# Patient Record
Sex: FEMALE | Race: WHITE | NOT HISPANIC OR LATINO | Employment: FULL TIME | URBAN - METROPOLITAN AREA
[De-identification: names, ages, dates, MRNs, and addresses within clinical notes are randomized per-mention and may not be internally consistent; named-entity substitution may affect disease eponyms.]

---

## 2017-07-11 ENCOUNTER — HISTORICAL (OUTPATIENT)
Dept: LAB | Facility: HOSPITAL | Age: 32
End: 2017-07-11

## 2017-07-11 LAB
ABS NEUT (OLG): 3.04 X10(3)/MCL (ref 2.1–9.2)
ALBUMIN SERPL-MCNC: 3.6 GM/DL (ref 3.4–5)
ALBUMIN/GLOB SERPL: 1.2 RATIO (ref 1.1–2)
ALP SERPL-CCNC: 44 UNIT/L (ref 38–126)
ALT SERPL-CCNC: 19 UNIT/L (ref 12–78)
AST SERPL-CCNC: 13 UNIT/L (ref 15–37)
BASOPHILS # BLD AUTO: 0 X10(3)/MCL (ref 0–0.2)
BASOPHILS NFR BLD AUTO: 0 %
BILIRUB SERPL-MCNC: 0.3 MG/DL (ref 0.2–1)
BILIRUBIN DIRECT+TOT PNL SERPL-MCNC: 0.1 MG/DL (ref 0–0.5)
BILIRUBIN DIRECT+TOT PNL SERPL-MCNC: 0.2 MG/DL (ref 0–0.8)
BUN SERPL-MCNC: 9 MG/DL (ref 7–18)
CALCIUM SERPL-MCNC: 8.5 MG/DL (ref 8.5–10.1)
CHLORIDE SERPL-SCNC: 109 MMOL/L (ref 98–107)
CHOLEST SERPL-MCNC: 126 MG/DL (ref 0–200)
CHOLEST/HDLC SERPL: 1.9 {RATIO} (ref 0–4)
CO2 SERPL-SCNC: 25 MMOL/L (ref 21–32)
CREAT SERPL-MCNC: 0.76 MG/DL (ref 0.55–1.02)
EOSINOPHIL # BLD AUTO: 0.1 X10(3)/MCL (ref 0–0.9)
EOSINOPHIL NFR BLD AUTO: 1 %
ERYTHROCYTE [DISTWIDTH] IN BLOOD BY AUTOMATED COUNT: 12.8 % (ref 11.5–17)
GLOBULIN SER-MCNC: 2.9 GM/DL (ref 2.4–3.5)
GLUCOSE SERPL-MCNC: 94 MG/DL (ref 74–106)
HCT VFR BLD AUTO: 36 % (ref 37–47)
HDLC SERPL-MCNC: 67 MG/DL (ref 35–60)
HGB BLD-MCNC: 12 GM/DL (ref 12–16)
LDLC SERPL CALC-MCNC: 43 MG/DL (ref 0–129)
LYMPHOCYTES # BLD AUTO: 2.1 X10(3)/MCL (ref 0.6–4.6)
LYMPHOCYTES NFR BLD AUTO: 38 %
MCH RBC QN AUTO: 33 PG (ref 27–31)
MCHC RBC AUTO-ENTMCNC: 33.3 GM/DL (ref 33–36)
MCV RBC AUTO: 98.9 FL (ref 80–94)
MONOCYTES # BLD AUTO: 0.3 X10(3)/MCL (ref 0.1–1.3)
MONOCYTES NFR BLD AUTO: 6 %
NEUTROPHILS # BLD AUTO: 3.04 X10(3)/MCL (ref 2.1–9.2)
NEUTROPHILS NFR BLD AUTO: 54 %
PLATELET # BLD AUTO: 248 X10(3)/MCL (ref 130–400)
PMV BLD AUTO: 10.9 FL (ref 9.4–12.4)
POTASSIUM SERPL-SCNC: 4 MMOL/L (ref 3.5–5.1)
PROT SERPL-MCNC: 6.5 GM/DL (ref 6.4–8.2)
RBC # BLD AUTO: 3.64 X10(6)/MCL (ref 4.2–5.4)
SODIUM SERPL-SCNC: 142 MMOL/L (ref 136–145)
TRIGL SERPL-MCNC: 81 MG/DL (ref 30–150)
VLDLC SERPL CALC-MCNC: 16 MG/DL
WBC # SPEC AUTO: 5.6 X10(3)/MCL (ref 4.5–11.5)

## 2017-09-18 LAB — RAPID GROUP A STREP (OHS): NEGATIVE

## 2018-01-11 ENCOUNTER — HISTORICAL (OUTPATIENT)
Dept: LAB | Facility: HOSPITAL | Age: 33
End: 2018-01-11

## 2018-01-11 LAB
ABS NEUT (OLG): 3.46 X10(3)/MCL (ref 2.1–9.2)
ALBUMIN SERPL-MCNC: 4 GM/DL (ref 3.4–5)
ALBUMIN/GLOB SERPL: 1 {RATIO}
ALP SERPL-CCNC: 47 UNIT/L (ref 45–117)
ALT SERPL-CCNC: 22 UNIT/L (ref 13–56)
AST SERPL-CCNC: 16 UNIT/L (ref 15–37)
BASOPHILS # BLD AUTO: 0.03 X10(3)/MCL (ref 0–0.2)
BASOPHILS NFR BLD AUTO: 0.5 % (ref 0–1)
BILIRUB SERPL-MCNC: 0.6 MG/DL (ref 0.2–1)
BILIRUBIN DIRECT+TOT PNL SERPL-MCNC: 0.2 MG/DL (ref 0–0.2)
BILIRUBIN DIRECT+TOT PNL SERPL-MCNC: 0.4 MG/DL (ref 0–1)
BUN SERPL-MCNC: 11 MG/DL (ref 7–18)
CALCIUM SERPL-MCNC: 8.6 MG/DL (ref 8.5–10.1)
CHLORIDE SERPL-SCNC: 105 MMOL/L (ref 98–107)
CO2 SERPL-SCNC: 24 MMOL/L (ref 21–32)
CREAT SERPL-MCNC: 0.74 MG/DL (ref 0.55–1.02)
EOSINOPHIL # BLD AUTO: 0.03 X10(3)/MCL (ref 0–0.9)
EOSINOPHIL NFR BLD AUTO: 0.5 % (ref 0–6.4)
ERYTHROCYTE [DISTWIDTH] IN BLOOD BY AUTOMATED COUNT: 12.1 % (ref 11.5–17)
FERRITIN SERPL-MCNC: 122.6 NG/ML (ref 8–388)
GLOBULIN SER-MCNC: 3 GM/DL (ref 2–4)
GLUCOSE SERPL-MCNC: 105 MG/DL (ref 74–106)
HCT VFR BLD AUTO: 37.3 % (ref 37–47)
HGB BLD-MCNC: 12.9 GM/DL (ref 12–16)
IMM GRANULOCYTES # BLD AUTO: 0.02 10*3/UL (ref 0–0.02)
IMM GRANULOCYTES NFR BLD AUTO: 0.3 % (ref 0–0.43)
LYMPHOCYTES # BLD AUTO: 2.29 X10(3)/MCL (ref 0.6–4.6)
LYMPHOCYTES NFR BLD AUTO: 37.2 % (ref 16–44)
MAGNESIUM SERPL-MCNC: 2.2 MG/DL (ref 1.8–2.4)
MCH RBC QN AUTO: 32.7 PG (ref 27–31)
MCHC RBC AUTO-ENTMCNC: 34.6 GM/DL (ref 33–36)
MCV RBC AUTO: 94.4 FL (ref 80–94)
MONOCYTES # BLD AUTO: 0.33 X10(3)/MCL (ref 0.1–1.3)
MONOCYTES NFR BLD AUTO: 5.4 % (ref 4–12.1)
NEUTROPHILS # BLD AUTO: 3.46 X10(3)/MCL (ref 2.1–9.2)
NEUTROPHILS NFR BLD AUTO: 56.1 % (ref 43–73)
NRBC BLD AUTO-RTO: 0 % (ref 0–0.2)
PLATELET # BLD AUTO: 252 X10(3)/MCL (ref 130–400)
PMV BLD AUTO: 9.8 FL (ref 7.4–10.4)
POTASSIUM SERPL-SCNC: 3.9 MMOL/L (ref 3.5–5.1)
PROT SERPL-MCNC: 7.4 GM/DL (ref 6.4–8.2)
RBC # BLD AUTO: 3.95 X10(6)/MCL (ref 4.2–5.4)
SODIUM SERPL-SCNC: 137 MMOL/L (ref 136–145)
TSH SERPL-ACNC: 0.87 MIU/ML (ref 0.36–3.74)
WBC # SPEC AUTO: 6.2 X10(3)/MCL (ref 4.5–11.5)

## 2018-07-17 ENCOUNTER — HISTORICAL (OUTPATIENT)
Dept: ADMINISTRATIVE | Facility: HOSPITAL | Age: 33
End: 2018-07-17

## 2018-07-17 LAB
BILIRUB SERPL-MCNC: NEGATIVE MG/DL
BLOOD URINE, POC: NEGATIVE
CLARITY, POC UA: NORMAL
COLOR, POC UA: YELLOW
GLUCOSE UR QL STRIP: NEGATIVE
KETONES UR QL STRIP: NEGATIVE
LEUKOCYTE EST, POC UA: NEGATIVE
NITRITE, POC UA: NEGATIVE
PH, POC UA: 7
POC BETA-HCG (QUAL): NEGATIVE
PROTEIN, POC: NEGATIVE
SPECIFIC GRAVITY, POC UA: 1.01
UROBILINOGEN, POC UA: NORMAL

## 2018-07-18 ENCOUNTER — HISTORICAL (OUTPATIENT)
Dept: URGENT CARE | Facility: CLINIC | Age: 33
End: 2018-07-18

## 2018-07-20 LAB — FINAL CULTURE: NO GROWTH

## 2018-08-27 LAB — RAPID GROUP A STREP (OHS): NEGATIVE

## 2018-11-28 ENCOUNTER — HISTORICAL (OUTPATIENT)
Dept: LAB | Facility: HOSPITAL | Age: 33
End: 2018-11-28

## 2018-11-28 LAB
T3FREE SERPL-MCNC: 3.14 PG/ML (ref 2.18–3.98)
T4 FREE SERPL-MCNC: 1.08 NG/DL (ref 0.76–1.46)
TSH SERPL-ACNC: 1.43 MIU/L (ref 0.36–3.74)

## 2019-01-11 ENCOUNTER — HISTORICAL (OUTPATIENT)
Dept: URGENT CARE | Facility: CLINIC | Age: 34
End: 2019-01-11

## 2019-01-11 LAB
INFLUENZA A ANTIGEN, POC: POSITIVE
INFLUENZA B ANTIGEN, POC: NEGATIVE
RAPID GROUP A STREP (OHS): POSITIVE

## 2019-01-13 LAB — FINAL CULTURE: NORMAL

## 2019-02-21 ENCOUNTER — HISTORICAL (OUTPATIENT)
Dept: ADMINISTRATIVE | Facility: HOSPITAL | Age: 34
End: 2019-02-21

## 2019-02-21 LAB
BILIRUB SERPL-MCNC: NEGATIVE MG/DL
BLOOD URINE, POC: NORMAL
CLARITY, POC UA: CLEAR
COLOR, POC UA: YELLOW
GLUCOSE UR QL STRIP: NEGATIVE
KETONES UR QL STRIP: NORMAL
LEUKOCYTE EST, POC UA: NEGATIVE
NITRITE, POC UA: NEGATIVE
PH, POC UA: 6.5
PROTEIN, POC: NEGATIVE
SPECIFIC GRAVITY, POC UA: 1.01
UROBILINOGEN, POC UA: NORMAL

## 2019-07-01 LAB — RAPID GROUP A STREP (OHS): NEGATIVE

## 2019-09-10 ENCOUNTER — HISTORICAL (OUTPATIENT)
Dept: ADMINISTRATIVE | Facility: HOSPITAL | Age: 34
End: 2019-09-10

## 2019-09-24 ENCOUNTER — HISTORICAL (OUTPATIENT)
Dept: RADIOLOGY | Facility: HOSPITAL | Age: 34
End: 2019-09-24

## 2019-11-09 ENCOUNTER — HISTORICAL (OUTPATIENT)
Dept: ADMINISTRATIVE | Facility: HOSPITAL | Age: 34
End: 2019-11-09

## 2019-11-19 ENCOUNTER — HISTORICAL (OUTPATIENT)
Dept: ADMINISTRATIVE | Facility: HOSPITAL | Age: 34
End: 2019-11-19

## 2019-11-20 ENCOUNTER — HISTORICAL (OUTPATIENT)
Dept: ADMINISTRATIVE | Facility: HOSPITAL | Age: 34
End: 2019-11-20

## 2019-11-22 ENCOUNTER — HISTORICAL (OUTPATIENT)
Dept: SURGERY | Facility: HOSPITAL | Age: 34
End: 2019-11-22

## 2020-11-10 LAB
INFLUENZA A ANTIGEN, POC: NEGATIVE
INFLUENZA B ANTIGEN, POC: NEGATIVE
RAPID GROUP A STREP (OHS): NEGATIVE

## 2020-12-30 ENCOUNTER — HISTORICAL (OUTPATIENT)
Dept: LAB | Facility: HOSPITAL | Age: 35
End: 2020-12-30

## 2020-12-30 LAB
ABS NEUT (OLG): 3.71 X10(3)/MCL (ref 2.1–9.2)
ALBUMIN SERPL-MCNC: 3.9 GM/DL (ref 3.5–5)
ALBUMIN/GLOB SERPL: 1.6 RATIO (ref 1.1–2)
ALP SERPL-CCNC: 73 UNIT/L (ref 40–150)
ALT SERPL-CCNC: 47 UNIT/L (ref 0–55)
AST SERPL-CCNC: 24 UNIT/L (ref 5–34)
BASOPHILS # BLD AUTO: 0.04 X10(3)/MCL (ref 0–0.2)
BASOPHILS NFR BLD AUTO: 0.6 % (ref 0–1)
BILIRUB SERPL-MCNC: 0.5 MG/DL (ref 0.2–1.2)
BILIRUBIN DIRECT+TOT PNL SERPL-MCNC: 0.2 MG/DL (ref 0–0.5)
BILIRUBIN DIRECT+TOT PNL SERPL-MCNC: 0.3 MG/DL (ref 0–0.8)
BUN SERPL-MCNC: 8.5 MG/DL (ref 7–18.7)
CALCIUM SERPL-MCNC: 8.8 MG/DL (ref 8.4–10.2)
CHLORIDE SERPL-SCNC: 107 MMOL/L (ref 98–107)
CHOLEST SERPL-MCNC: 140 MG/DL
CHOLEST/HDLC SERPL: 2 {RATIO} (ref 0–5)
CO2 SERPL-SCNC: 25 MMOL/L (ref 22–29)
CREAT SERPL-MCNC: 0.79 MG/DL (ref 0.57–1.11)
EOSINOPHIL # BLD AUTO: 0.07 X10(3)/MCL (ref 0–0.9)
EOSINOPHIL NFR BLD AUTO: 1.1 % (ref 0–6.4)
ERYTHROCYTE [DISTWIDTH] IN BLOOD BY AUTOMATED COUNT: 12.3 % (ref 11.5–17)
GLOBULIN SER-MCNC: 2.5 GM/DL (ref 2.4–3.5)
GLUCOSE SERPL-MCNC: 98 MG/DL (ref 74–100)
HCT VFR BLD AUTO: 36.5 % (ref 37–47)
HDLC SERPL-MCNC: 57 MG/DL (ref 40–60)
HGB BLD-MCNC: 12.3 GM/DL (ref 12–16)
IMM GRANULOCYTES # BLD AUTO: 0.01 10*3/UL (ref 0–0.02)
IMM GRANULOCYTES NFR BLD AUTO: 0.2 % (ref 0–0.43)
LDLC SERPL CALC-MCNC: 67 MG/DL (ref 50–140)
LYMPHOCYTES # BLD AUTO: 2.24 X10(3)/MCL (ref 0.6–4.6)
LYMPHOCYTES NFR BLD AUTO: 34.9 % (ref 16–44)
MCH RBC QN AUTO: 32.5 PG (ref 27–31)
MCHC RBC AUTO-ENTMCNC: 33.7 GM/DL (ref 33–36)
MCV RBC AUTO: 96.3 FL (ref 80–94)
MONOCYTES # BLD AUTO: 0.34 X10(3)/MCL (ref 0.1–1.3)
MONOCYTES NFR BLD AUTO: 5.3 % (ref 4–12.1)
NEUTROPHILS # BLD AUTO: 3.71 X10(3)/MCL (ref 2.1–9.2)
NEUTROPHILS NFR BLD AUTO: 57.9 % (ref 43–73)
NRBC BLD AUTO-RTO: 0 % (ref 0–0.2)
PLATELET # BLD AUTO: 286 X10(3)/MCL (ref 130–400)
PMV BLD AUTO: 9.4 FL (ref 7.4–10.4)
POTASSIUM SERPL-SCNC: 3.7 MMOL/L (ref 3.5–5.1)
PROT SERPL-MCNC: 6.4 GM/DL (ref 6.4–8.3)
RBC # BLD AUTO: 3.79 X10(6)/MCL (ref 4.2–5.4)
SODIUM SERPL-SCNC: 140 MMOL/L (ref 136–145)
TRIGL SERPL-MCNC: 82 MG/DL (ref 0–150)
TSH SERPL-ACNC: 1.66 UIU/ML (ref 0.35–4.94)
VLDLC SERPL CALC-MCNC: 16 MG/DL
WBC # SPEC AUTO: 6.4 X10(3)/MCL (ref 4.5–11.5)

## 2021-01-04 LAB — PAP RECOMMENDATION EXT: NORMAL

## 2021-04-15 LAB
RAPID GROUP A STREP (OHS): NEGATIVE
SARS-COV-2 RNA RESP QL NAA+PROBE: POSITIVE

## 2021-11-17 LAB
RAPID GROUP A STREP (OHS): NEGATIVE
SARS-COV-2 RNA RESP QL NAA+PROBE: NEGATIVE

## 2021-12-30 ENCOUNTER — HISTORICAL (OUTPATIENT)
Dept: LAB | Facility: HOSPITAL | Age: 36
End: 2021-12-30

## 2021-12-30 LAB
ABS NEUT (OLG): 3.2 X10(3)/MCL (ref 2.1–9.2)
ALBUMIN SERPL-MCNC: 4.1 GM/DL (ref 3.5–5)
ALBUMIN/GLOB SERPL: 1.6 RATIO (ref 1.1–2)
ALP SERPL-CCNC: 64 UNIT/L (ref 40–150)
ALT SERPL-CCNC: 14 UNIT/L (ref 0–55)
AST SERPL-CCNC: 18 UNIT/L (ref 5–34)
BASOPHILS # BLD AUTO: 0.05 X10(3)/MCL (ref 0–0.2)
BASOPHILS NFR BLD AUTO: 0.9 % (ref 0–1)
BILIRUB SERPL-MCNC: 0.6 MG/DL (ref 0.2–1.2)
BILIRUBIN DIRECT+TOT PNL SERPL-MCNC: 0.2 MG/DL (ref 0–0.5)
BILIRUBIN DIRECT+TOT PNL SERPL-MCNC: 0.4 MG/DL (ref 0–0.8)
BUN SERPL-MCNC: 9.3 MG/DL (ref 7–18.7)
CALCIUM SERPL-MCNC: 9.4 MG/DL (ref 8.4–10.2)
CHLORIDE SERPL-SCNC: 106 MMOL/L (ref 98–107)
CHOLEST SERPL-MCNC: 138 MG/DL
CHOLEST/HDLC SERPL: 2 {RATIO} (ref 0–5)
CO2 SERPL-SCNC: 27 MMOL/L (ref 22–29)
CREAT SERPL-MCNC: 0.77 MG/DL (ref 0.57–1.11)
EOSINOPHIL # BLD AUTO: 0.03 X10(3)/MCL (ref 0–0.9)
EOSINOPHIL NFR BLD AUTO: 0.5 % (ref 0–6.4)
ERYTHROCYTE [DISTWIDTH] IN BLOOD BY AUTOMATED COUNT: 13 % (ref 11.5–17)
GLOBULIN SER-MCNC: 2.6 GM/DL (ref 2.4–3.5)
GLUCOSE SERPL-MCNC: 101 MG/DL (ref 74–100)
HCT VFR BLD AUTO: 39.6 % (ref 37–47)
HDLC SERPL-MCNC: 61 MG/DL (ref 40–60)
HGB BLD-MCNC: 13.4 GM/DL (ref 12–16)
IMM GRANULOCYTES # BLD AUTO: 0.01 10*3/UL (ref 0–0.02)
IMM GRANULOCYTES NFR BLD AUTO: 0.2 % (ref 0–0.43)
LDLC SERPL CALC-MCNC: 63 MG/DL (ref 50–140)
LYMPHOCYTES # BLD AUTO: 2.01 X10(3)/MCL (ref 0.6–4.6)
LYMPHOCYTES NFR BLD AUTO: 35.8 % (ref 16–44)
MCH RBC QN AUTO: 33.8 PG (ref 27–31)
MCHC RBC AUTO-ENTMCNC: 33.8 GM/DL (ref 33–36)
MCV RBC AUTO: 99.7 FL (ref 80–94)
MONOCYTES # BLD AUTO: 0.32 X10(3)/MCL (ref 0.1–1.3)
MONOCYTES NFR BLD AUTO: 5.7 % (ref 4–12.1)
NEUTROPHILS # BLD AUTO: 3.2 X10(3)/MCL (ref 2.1–9.2)
NEUTROPHILS NFR BLD AUTO: 56.9 % (ref 43–73)
NRBC BLD AUTO-RTO: 0 % (ref 0–0.2)
PLATELET # BLD AUTO: 278 X10(3)/MCL (ref 130–400)
PMV BLD AUTO: 10 FL (ref 7.4–10.4)
POTASSIUM SERPL-SCNC: 4.3 MMOL/L (ref 3.5–5.1)
PROT SERPL-MCNC: 6.7 GM/DL (ref 6.4–8.3)
RBC # BLD AUTO: 3.97 X10(6)/MCL (ref 4.2–5.4)
SODIUM SERPL-SCNC: 140 MMOL/L (ref 136–145)
TRIGL SERPL-MCNC: 71 MG/DL (ref 0–150)
TSH SERPL-ACNC: 1.03 UIU/ML (ref 0.35–4.94)
VLDLC SERPL CALC-MCNC: 14 MG/DL
WBC # SPEC AUTO: 5.6 X10(3)/MCL (ref 4.5–11.5)

## 2022-04-11 ENCOUNTER — HISTORICAL (OUTPATIENT)
Dept: ADMINISTRATIVE | Facility: HOSPITAL | Age: 37
End: 2022-04-11
Payer: COMMERCIAL

## 2022-04-25 VITALS
OXYGEN SATURATION: 98 % | BODY MASS INDEX: 24.77 KG/M2 | HEIGHT: 60 IN | WEIGHT: 126.19 LBS | SYSTOLIC BLOOD PRESSURE: 108 MMHG | DIASTOLIC BLOOD PRESSURE: 77 MMHG

## 2022-05-05 NOTE — HISTORICAL [facility] CERNER
This is a historical note converted from Chang. Formatting and pictures may have been removed.  Please reference Chang for original formatting and attached multimedia. OPERATIVE REPORT  ?  DATE: 11/22/2019  ?  ASSISTANT: Jennifer Burnette  ?  PREOPERATIVE DIAGNOSIS:  1.? Right chronic ankle instability  ?  POSTOPERATIVE DIAGNOSIS:  Same  ?  PROCEDURES:  1. ?Right?ankle Broström procedure  ?  ANESTHESIA:  General anesthesia  ?  BLOOD LOSS:  None  ?  DVT PROPHYLAXIS:  This patient placed on aspirin for 2 weeks  ?  INSTRUMENTATION:  Arthrex?internal brace with?fiber tape,?Arthrex?3.5 swivel lock anchor, Arthrex?4.75 swivel lock anchor  ?  PROCEDURE IN DETAIL:  ?  Brostrum Procedure with Internal Brace augmentation  ?  This patient was brought to the room placed on table in a supine position. ?The operative extremity was draped and prepped in the normal sterile fashion. ?We performed a timeout to confirm the patient, the operative extremity, the procedure, if antibiotics were given, and allergies. ?  ?  A longitudinal incision was made over distal fibula. Careful dissection was carried deep until the extensor retinaculum and the peroneal tendons were seen. The peroneal tendons were then protected with a retractor. A hemostat was then passed across the redundant ATFL. The ATFL was then transected. I then repaired the ATFL with a 0 non-absorbable suture in pants over vest fashion. I did this with 3 sutures. I then reinforced the Brostrum repair with an Internal Brace. A suture anchor was placed into the talus and into the distal fibula. A suture tape was used between the suture anchors for reinforcement.  ?  The patients incision and wound was thoroughly irrigated. ?I then closed the subcutaneous tissues with a 2-0 Vicryl and then the skin with a 3-0 nylon. ?A sterile dressing was applied and patient was placed in a short leg posterior splint. ?Patient tolerated the procedure well without any intraoperative  complications.

## 2022-05-05 NOTE — HISTORICAL [facility] CERNER
This is a historical note converted from Chang. Formatting and pictures may have been removed.  Please reference Chang for original formatting and attached multimedia. Chief Complaint  Numbness, swelling ?to right wrist with pain with tingling post fall on 11/9. Seen by Dr. Mireles, elbow Xray done, casted with removal of splint on yesterday then place in mobile spint. Since then doran has increase to wrist. Advised to have wrist Xray done  History of Present Illness  34-year-old present to clinic with a history of right wrist pain on and off for 2 weeks.? States post fall?elbow fracture?for which patient was in heart brace for 2 weeks.? Brace was removed yesterday.? States pain more focused?to right wrist now.? Bruising?palmar aspect.? Right fingers range of motion present.? Currently using elbow brace to keep the joint in neutral position. ?Follows up with?primary orthopedics Dr. Messina  Review of Systems  Constitutional : No fever, no fatigue  Neck : Negative except HPI  Respiratory : No shortness of breath, no wheezing  Cardiovascular : No chest pain  Musculoskeletal : Negative except HPI  Integumentary : No rash, no abnormal lesion  Neurological : Negative for tingling numbness and weakness  Physical Exam  Vitals & Measurements  T:?37.3? ?C (Tympanic)? HR:?104(Peripheral)? RR:?16? BP:?131/93? SpO2:?100%?  HT:?153?cm? WT:?55?kg? BMI:?23.5?  General : Alert and oriented, No apparent distress, Afebrile  Neck -: supple, Non Tender  Respiratory :Lungs are clear to auscultate, Breath sounds are equal  Cardiovascular : Normal rate, normal volume pulse bilateral  Muskeloskeletal :?Right elbow?brace present.? Right wrist?mild bruising?palmar aspect, tender to palpate.? Right fingers range of motion present.? Radial pulse 2+ bilateral  Integumentary :?Warm, Dry?  Neurologic : Alert and Oriented X 4, sensations intact, motor intact  Assessment/Plan  1.?Right wrist pain?M25.531  ?Reviewed the x-rays, no concerns of acute  finding.? Radiology final results will be monitored and reported.? With persistent symptoms will follow-up with primary orthopedics  Rest, ice,?Tylenol or ibuprofen for pain.? Call or return to clinic for any questions  Ordered:  Office/Outpatient Visit Level 3 Established [id number] PC, Right wrist pain, [facility]-RR, 11/20/19 18:07:00 CST  XR Wrist Right Minimum 3 Views, Routine, 11/20/19 17:51:00 CST, Fall, Fall 2 weeks surgery, None, Ambulatory, Rad Type, Right wrist pain, Not Scheduled, 11/20/19 17:51:00 CST  ?   Problem List/Past Medical History  Ongoing  Anxiety  Bilateral hand numbness  Bulging of cervical intervertebral disc  Cervicalgia  Dislocation of right elbow  Fatigue  Fibromyalgia  Hydromyelia  Insomnia  Migraine headache  Mitral valve prolapse  Nausea  OCD - Obsessive-compulsive disorder  Otitis media  Palpitations  Restless leg syndrome  Right ankle instability  Historical  Contraceptive surveillance  Sinusitis  Procedure/Surgical History  Surgical removal of wisdom tooth (08.2018)  Carpal tunnel release (12.2011)  Carpal tunnel release (11.2011)   Medications  Acidophilus Probiotic Blend oral capsule, 1 cap(s), Oral, Daily  busPIRone 15 mg oral tablet, 15 mg= 1 tab(s), Oral, BID, 3 refills,? ?Still taking, not as prescribed: once daily  cyclobenzaprine 10 mg oral tablet, See Instructions, 1 refills,? ?Still taking, not as prescribed: half tablet nightly per pt  Flonase 50 mcg/inh nasal spray, 1 spray(s), Nasal, Daily, 5 refills,? ?Still taking, not as prescribed: prn  Maxalt-MLT 5 mg oral tablet, disintegrating, 5 mg= 1 tab(s), Oral, Daily, PRN, 1 refills,? ?Not taking  potassium chloride 99 mg oral tablet, 99 mg= 1 tab(s), Oral, Daily  ropinirole 0.25 mg oral tablet, See Instructions, 2 refills  Vitamin B12 500 mcg oral tablet, 500 mcg= 1 tab(s), Oral, Daily  Vitamin D3 1000 intl units oral tablet, 1000 IntUnit= 1 tab(s), Oral, Daily  Zofran 4 mg oral tablet, 4 mg= 1 tab(s), Oral, q8hr, 2 refills,? ?Still  taking, not as prescribed: prn  zolpidem 10 mg oral tablet, 10 mg= 1 tab(s), Oral, Once a day (at bedtime), 2 refills  Allergies  penicillins?(Rash)  Social History  Abuse/Neglect  No, 11/20/2019  Alcohol  Current, 1-2 times per month, 11/19/2019  Employment/School  Employed, Work/School description: ., 01/11/2016  Exercise  Exercise duration: 30. Exercise frequency: 3-4 times/week. Self assessment: Good condition. Exercise type: Walking., 01/13/2016  Home/Environment  Lives with Spouse., 01/11/2016  Nutrition/Health  Regular, 01/13/2016  Sexual  Sexually active: Yes., 01/13/2016  Substance Use  Never, 11/19/2019  Tobacco  Never (less than 100 in lifetime), No, 11/20/2019  Family History  Asthma.: Mother.  Depression 27-APR-2016 23:33:46<$>: Mother.  Fibromyalgia: Mother.  Heart disease: Father.  Health Maintenance  Health Maintenance  ???Pending?(in the next year)  ??? ??Due?  ??? ? ? ?ADL Screening due??11/20/19??and every 1??year(s)  ??? ? ? ?Influenza Vaccine due??11/20/19??and every?  ??? ? ? ?Tetanus Vaccine due??11/20/19??and every 10??year(s)  ??? ??Due In Future?  ??? ? ? ?Alcohol Misuse Screening not due until??01/01/20??and every 1??year(s)  ??? ? ? ?Obesity Screening not due until??01/01/20??and every 1??year(s)  ??? ? ? ?Depression Screening not due until??04/07/20??and every 1??year(s)  ??? ? ? ?Cervical Cancer Screening not due until??10/10/20??and every 3??year(s)  ??? ? ? ?Blood Pressure Screening not due until??11/19/20??and every 1??year(s)  ??? ? ? ?Body Mass Index Check not due until??11/19/20??and every 1??year(s)  ???Satisfied?(in the past 1 year)  ??? ??Satisfied?  ??? ? ? ?Alcohol Misuse Screening on??10/01/19.??Satisfied by June Medina  ??? ? ? ?Blood Pressure Screening on??11/20/19.??Satisfied by Alvarado Carreno LPN  ??? ? ? ?Body Mass Index Check on??11/20/19.??Satisfied by Alvarado Carreno LPN  ??? ? ? ?Depression Screening on??04/08/19.??Satisfied by David  Pao BEST.  ??? ? ? ?Influenza Vaccine on??02/21/19.??Satisfied by Vince Mcelroy LPN  ??? ? ? ?Obesity Screening on??11/20/19.??Satisfied by Alvarado Carreno LPN  ?

## 2022-05-05 NOTE — HISTORICAL [facility]
This is a historical note converted from Chang. Formatting and pictures may have been removed.  Please reference Chang for original formatting and attached multimedia. Chief Complaint  Right foot pain and swelling for 2 months, states the pain radiate up to her Rt Ankle. ?Pt states she tripped over some Trolley tracks in [place]. ?Xrays done ?[facility] on 6/27/19. ?Pt states she tought the pain would go away, but still have pain.  History of Present Illness  This is a 33-year-old female that had a significant?ankle sprain approximately 2 to 3 months ago.? She states she was in [place] and it was at night and she tripped over a trolley track?and significantly sprained her ankle.? She went to the emergency room when she returned back to left yet and they referred her to see me.? She does not have any more bruising or ecchymosis but she continues to complain of weakness and?a weird type of nerve pain?throughout the ankle and foot.  Review of Systems  GENERAL: No fevers, chills, unexpected weight loss or gain  MUSCULOSKELETAL: Joint pain, extremity pain  Physical Exam  Vitals & Measurements  BP:?128/94?  HT:?153?cm? WT:?56?kg? BMI:?23.92?  General: Well-developed, well-nourished.  Neuro: Alert and oriented x 3.  Psych: Normal mood and affect.  CV: Palpable radial pulses.  Resp: Smooth and unlabored.  Skin: No evidence of focal lesions or trauma.  Hem/Imm/Lymph: No evidence of lymphangitis or adenopathy.  Gait: No trendelenburg gait.  DTR: 2+, no hypo or hyperreflexia.  Coordination and Balance: No tremors or abnormal station.  Ankle Exam:  No obvious deformity. Plantar flexion and dorsiflexion is 60 degrees and 30 degrees, respectively. Negative squeeze test. Negative lateral malleolus tenderness. Negative medial malleolus tenderness.? Positive talofibular ligament tenderness.? Positive anterior draw and lateral tilt. 4/5 strength, normal skin appearance and palpable pulses distally. Sensibility  normal.  Assessment/Plan  1.?Right ankle sprain?S93.401A  ?Plan order MRI of this patients ankle to rule out a chronic?ankle?ligament?rupture.? I think that her lateral?ankle ligaments did not heal. ?I think she also has a?intrasubstance tear of the peroneal tendons and also a superficial peroneal nerve?injury.? I told her that there is not much we can do about the nerve.? However if the MRI shows?chronic ligament ruptures and peroneal tendon tears that we could possibly fix this with a surgical procedure.? I gave her some information on all of these?different?injuries.? She will come back to review her MRI.  Ordered:  MRI Ext Lower Joint Right W/O Contrast, Routine, 09/10/19 16:31:00 CDT, Injury, knee & below, Rule out anterior inferior talofibular ligament rupture and peroneal tendon intrasubstance tears, None, Ambulatory, Rad Type, Order for future visit, Right ankle sprain  Right ankle instability ...  Office/Outpatient Visit Level 3 New [id number] PC, Right ankle sprain  Right ankle instability  Peroneal tendon tear  Superficial peroneal nerve neuropathy, [facility], 09/10/19 16:28:00 CDT  ?  2.?Right ankle instability?M25.371  Ordered:  MRI Ext Lower Joint Right W/O Contrast, Routine, 09/10/19 16:31:00 CDT, Injury, knee & below, Rule out anterior inferior talofibular ligament rupture and peroneal tendon intrasubstance tears, None, Ambulatory, Rad Type, Order for future visit, Right ankle sprain  Right ankle instability ...  Office/Outpatient Visit Level 3 New [id number] PC, Right ankle sprain  Right ankle instability  Peroneal tendon tear  Superficial peroneal nerve neuropathy, [facility], 09/10/19 16:28:00 CDT  ?  3.?Peroneal tendon tear?S86.319A  Ordered:  MRI Ext Lower Joint Right W/O Contrast, Routine, 09/10/19 16:31:00 CDT, Injury, knee & below, Rule out anterior inferior talofibular ligament rupture and peroneal tendon intrasubstance tears, None, Ambulatory, Rad Type, Order for  future visit, Right ankle sprain  Right ankle instability ...  Office/Outpatient Visit Level 3 New [id number] PC, Right ankle sprain  Right ankle instability  Peroneal tendon tear  Superficial peroneal nerve neuropathy, [facility], 09/10/19 16:28:00 CDT  ?  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  Office/Outpatient Visit Level 3 New [id number] PC, Right ankle sprain  Right ankle instability  Peroneal tendon tear  [facility]oneal nerve [facility] 09/10[facility] 16:28:00 CDT  ?   Problem List/Past Medical History  Ongoing  Anxiety  Bilateral hand numbness  Bulging of cervical intervertebral disc  Cervicalgia  Fatigue  Fibromyalgia  Hydromyelia  Insomnia  Migraine headache  Mitral valve prolapse  Nausea  OCD - Obsessive-compulsive disorder  Otitis media  Palpitations  Restless leg syndrome  Historical  Contraceptive surveillance  Sinusitis  Procedure/Surgical History  Surgical removal of wisdom tooth (08.2018)  Carpal tunnel release (12.2011)  Carpal tunnel release (11.2011)   Medications  Acidophilus Probiotic Blend oral capsule, 1 cap(s), Oral, Daily  busPIRone 15 mg oral tablet, 15 mg= 1 tab(s), Oral, BID, 3 refills  cyclobenzaprine 10 mg oral tablet, See Instructions, 1 refills  Flonase 50 mcg/inh nasal spray, 1 spray(s), Nasal, Daily, 5 refills  Maxalt-MLT 5 mg oral tablet, disintegrating, 5 mg= 1 tab(s), Oral, Daily, PRN, 1 refills  Medrol Dosepak 4 mg oral tablet, 1 packet(s), Oral, As Directed,? ?Not taking  potassium chloride 99 mg oral tablet, 99 mg= 1 tab(s), Oral, Daily  ropinirole 0.25 mg oral tablet, See Instructions, 2 refills  Vitamin B12 500 mcg oral tablet, 500  mcg= 1 tab(s), Oral, Daily  Vitamin D3 1000 intl units oral tablet, 1000 IntUnit= 1 tab(s), Oral, Daily  Zofran 4 mg oral tablet, 4 mg= 1 tab(s), Oral, q8hr, 1 refills  zolpidem 6.25 mg oral tablet, extended release, 6.25 mg= 1 tab(s), Oral, Once a day (at bedtime), 2 refills  Allergies  penicillins?(Rash)  Social History  Abuse/Neglect  No, 09/10/2019  Alcohol  Current, Beer, 1-2 times per week, 2 drinks/episode average., 04/08/2019  Employment/School  Employed, Work/School description: ., 01/11/2016  Exercise  Exercise duration: 30. Exercise frequency: 3-4 times/week. Self assessment: Good condition. Exercise type: Walking., 01/13/2016  Home/Environment  Lives with Spouse., 01/11/2016  Nutrition/Health  Regular, 01/13/2016  Sexual  Sexually active: Yes., 01/13/2016  Substance Use  Never, 01/11/2016  Tobacco  Never (less than 100 in lifetime), No, 09/10/2019  Family History  Asthma.: Mother.  Depression 27-APR-2016 23:33:46<$>: Mother.  Fibromyalgia: Mother.  Heart disease: Father.  Health Maintenance  Health Maintenance  ???Pending?(in the next year)  ??? ??OverDue  ??? ? ? ?Alcohol Misuse Screening due??01/01/19??and every 1??year(s)  ??? ??Due?  ??? ? ? ?ADL Screening due??09/10/19??and every 1??year(s)  ??? ? ? ?Influenza Vaccine due??09/10/19??and every?  ??? ? ? ?Tetanus Vaccine due??09/10/19??and every 10??year(s)  ??? ??Due In Future?  ??? ? ? ?Obesity Screening not due until??01/01/20??and every 1??year(s)  ??? ? ? ?Depression Screening not due until??04/07/20??and every 1??year(s)  ??? ? ? ?Blood Pressure Screening not due until??07/07/20??and every 1??year(s)  ??? ? ? ?Body Mass Index Check not due until??07/07/20??and every 1??year(s)  ???Satisfied?(in the past 1 year)  ??? ??Satisfied?  ??? ? ? ?Alcohol Misuse Screening on??11/28/18.??Satisfied by Zoraida Tavera LPN  ??? ? ? ?Blood Pressure Screening on??09/10/19.??Satisfied by June Medina  ??? ? ? ?Body Mass Index Check  on??09/10/19.??Satisfied by June Medina  ??? ? ? ?Depression Screening on??04/08/19.??Satisfied by Pao Hernandez  ??? ? ? ?Influenza Vaccine on??02/21/19.??Satisfied by Vince Mcelroy LPN  ??? ? ? ?Obesity Screening on??09/10/19.??Satisfied by June Medina  ?  Diagnostic Results  X-rays show no acute osseous pathology.

## 2022-05-05 NOTE — HISTORICAL [facility]
This is a historical note converted from Chang. Formatting and pictures may have been removed.  Please reference Chang for original formatting and attached multimedia. Chief Complaint  Urinary urgency/frequency onset 07/08 with pain to left lower back area. Also having pressure to bladder  History of Present Illness  32-year-old female presents with concern of about 9 days?of urinary urgency and frequency with associated left lower back pain?and suprapubic pressure?which she thinks is bladder related.?No fever.?Patient thinks she passed the stone?over the last week.?No fevers.?No change in vaginal discharge.? Patient did have issues with constipation and diarrhea over the last week.?Possible fever 1 week prior?but none since then.? Patient also having?sensation of?difficulty emptying her bladder.  Review of Systems  Constitutional_negative for fever  ENMT_negative for sore throat  Respiratory_negative for SOB  Gastrointestinal_negative for nausea or vomiting  Integumentary_negative for rash  All Other ROS_ negative except HPI?  Physical Exam  Vitals & Measurements  T:?37.2? ?C (Oral)? HR:?76(Peripheral)? RR:?16? BP:?131/90? SpO2:?99%?  HT:?153?cm? HT:?153?cm? WT:?53.52?kg? WT:?53.52?kg? BMI:?22.86?  GEN: NAD  CV: S1S2 RRR  RESP: CTA B no RRW  ABD: Mild tenderness throughout,?no focal tenderness,?ND, no CVA tenderness,?minimal suprapubic tenderness  Psych: Approp mood and affect  Assessment/Plan  1.?Acute cystitis  ? Urine pregnancy test negative.  X-ray of the abdomen done today, per my review?no concern for stones, positive constipation noted.?Will call with final report.  Recommend taking Urimar for discomfort,?will send urine for culture, should get urine culture results?in 3 days.? Increase fluid intake.?  Miralax twice a day or Colace twice a day.? Pear juice and lots of water.  ER for?worsening symptoms.  Ordered:  hyoscyamine/methena/mblue/phenylsal/sodbiphos, 1 tab(s), Oral, QID, X 5 day(s), # 20 tab(s), 0  Refill(s), Pharmacy: [id number]  Office/Outpatient Visit Level 4 Established [id number] PC, Acute cystitis, [facility]CC-RR, 07/17/18 17:53:00 CDT  Urine Culture [id number], Routine collect, 07/17/18 17:53:00 CDT, Order for future visit, Urine, Nurse collect, Stop date 07/17/18 17:53:00 CDT, Acute cystitis  Urine Pregnancy POC [id number] PC, 07/17/18 17:53:00 CDT, UCC-RR  XR Abdomen KUB 1 View, Routine, 07/17/18 17:53:00 CDT, Abdominal Pain, suprapubic, L sided, None, Ambulatory, Rad Type, Acute cystitis, Not Scheduled, 07/17/18 17:53:00 CDT  ?   Problem List/Past Medical History  Ongoing  Anxiety  Fatigue  Fibromyalgia  Insomnia  Migraine headache  Mitral valve prolapse  OCD - Obsessive-compulsive disorder  Palpitations  Restless leg syndrome  Historical  Contraceptive surveillance  Sinusitis  Procedure/Surgical History  Carpal tunnel (01/10/2011).  Medications  Acidophilus Probiotic Blend oral capsule, 1 cap(s), Oral, Daily  busPIRone 15 mg oral tablet, 15 mg= 1 tab(s), Oral, BID, 5 refills  cyclobenzaprine 10 mg oral tablet, See Instructions  Microgestin FE 1.5/30 oral tablet, 1 tab(s), Oral, Daily, 11 refills  Multivitamins and Minerals oral tablet  potassium chloride 99 mg oral tablet, 99 mg= 1 tab(s), Oral, Daily  propranolol 20 mg oral tablet, 20 mg= 1 tab(s), Oral, BID, 5 refills  ropinirole 0.25 mg oral tablet, See Instructions, 2 refills  Vitamin B12 500 mcg oral tablet, 500 mcg= 1 tab(s), Oral, Daily  Vitamin D3 1000 intl units oral tablet, 1000 IntUnit= 1 tab(s), Oral, Daily  zolpidem 10 mg oral tablet, 10 mg= 1 tab(s), Oral, Once a day (at bedtime), 2 refills  Allergies  penicillins?(Rash)  Social History  Alcohol  Current, Beer, 1-2 times per month, 01/11/2016  Employment/School  Employed, Work/School description: ., 01/11/2016  Exercise  Exercise duration: 30. Exercise frequency: 3-4 times/week. Self assessment: Good condition. Exercise type: Walking., 01/13/2016  Home/Environment  Lives with Spouse.,  01/11/2016  Nutrition/Health  Regular, 01/13/2016  Sexual  Sexually active: Yes., 01/13/2016  Substance Abuse  Never, 01/11/2016  Tobacco  Former smoker, Cigarettes, 01/11/2016  Family History  Asthma.: Mother.  Depression 27-APR-2016 23:33:46<$>: Mother.  Fibromyalgia: Mother.  Heart disease: Father.  Health Maintenance  Health Maintenance  ???Pending?(in the next year)  ??? ??Due?  ??? ? ? ?Tetanus Vaccine due??07/17/18??and every 10??year(s)  ??? ??Refused?  ??? ? ? ?Influenza Vaccine due??10/02/18??and every 1??year(s)  ??? ??Due In Future?  ??? ? ? ?Alcohol Misuse Screening not due until??10/11/18??and every 1??year(s)  ??? ? ? ?Depression Screening not due until??06/26/19??and every 1??year(s)  ???Satisfied?(in the past 1 year)  ??? ??Satisfied?  ??? ? ? ?Alcohol Misuse Screening on??10/11/17.??Satisfied by Juanita Rothman LPN  ??? ? ? ?Blood Pressure Screening on??07/17/18.??Satisfied by Alvarado Carreno LPN  ??? ? ? ?Body Mass Index Check on??07/17/18.??Satisfied by Alvarado Carreno LPN  ??? ? ? ?Cervical Cancer Screening on??10/11/17.??Satisfied by Angela Good  ??? ? ? ?Depression Screening on??06/26/18.??Satisfied by Anthony Rao LPN  ??? ? ? ?Obesity Screening on??07/17/18.??Satisfied by Alvarado Carreno LPN  ??? ? ? ?Tobacco Use Screening on??07/17/18.??Satisfied by Alvarado Carreno LPN  ??? ??Refused?  ??? ? ? ?Influenza Vaccine on??10/11/17.??Recorded for Maria Dolores Hayward MD??Reason: Patient Refuses  ?  ?  Diagnostic Results  Yellow  Slightly cloudy  7  1.010  Negative  Negative  Negative  Negative  Negative  0.2 mg/dl  Negative  Negative

## 2022-05-05 NOTE — HISTORICAL [facility]
This is a historical note converted from [employer]. Formatting and pictures may have been removed.  Please reference [employer] for original formatting and attached multimedia. Chief Complaint  1 week f/u right elbow dislocation  History of Present Illness  This is a 33-year-old female that had a significant?ankle sprain approximately 2 to 3 months ago.? She states she was in [place] and it was at night and she tripped over a trolley track?and significantly sprained her ankle.? She went to the emergency room when she returned back to left yet and they referred her to see me.? She does not have any more bruising or ecchymosis but she continues to complain of weakness and?a weird type of nerve pain?throughout the ankle and foot. [1]  10/1/2019 this patient comes in today?to review her MRI and it shows a significant high-grade tear of the anterior inferior talofibular ligament. [1]  11/5/2019 this patient here to preop for a?right?ankle Broström procedure [1]  11/12/2019 this patient?comes in today after sustaining a right elbow dislocation. ?She was?fell off a fall a taken a picture with some friends.? She had an obvious deformity of her elbow.? She went to the?emergency room and was closed reduced and placed in a sling.? Taken Percocet for pain. ?States the pain is about a 6 out of 10. [1]  11/19/2019 this is a patient that is scheduled for a?Broström procedure of her right ankle this Friday.? She recently had an elbow dislocation and was placed in a cast.? I cut her out of the cast today.  Review of Systems  GENERAL: No fevers, chills, unexpected weight loss or gain  MUSCULOSKELETAL: Joint pain, extremity pain [2]  Physical Exam  Vitals & Measurements  HT:?153?cm? WT:?55.90?kg? BMI:?23.88?  General: Well-developed, well-nourished.  Neuro: Alert and oriented x 3.  Psych: Normal mood and affect.  CV: Palpable radial pulses.  Resp: Smooth and unlabored.  Skin: No evidence of focal lesions or trauma.  Hem/Imm/Lymph: No  evidence of lymphangitis or adenopathy.  Gait: No trendelenburg gait.  DTR: 2+, no hypo or hyperreflexia.  Coordination and Balance: No tremors or abnormal station.  Ankle Exam:  No obvious deformity. Plantar flexion and dorsiflexion is 60 degrees and 30 degrees, respectively. Negative squeeze test. Negative lateral malleolus tenderness. Negative medial malleolus tenderness.? Positive talofibular ligament tenderness.? Positive anterior draw and lateral tilt. 4/5 strength, normal skin appearance and palpable pulses distally. Sensibility normal [3]  Elbow Exam:  No obvious deformity. ?She lacks 30 degrees full extension and can flex to?70 degrees.? 2/5 strength, normal skin appearance and palpable pulses distally. Sensibility normal.  Assessment/Plan  1.?Right ankle instability?M25.371  ?I am still plan to do her ankle surgery this Friday. ?I will place her in a walking boot afterwards?and she will be weightbearing?only for transfers.? She states she has a wheelchair at home already.? 2 weeks after her surgery, I will let her start to weight-bear in the boot.  Ordered:  Office/Outpatient Visit Level 3 Established 70433 PC, Right ankle instability  Dislocation of right elbow, [facility], 11/19/19 17:00:00 CST  ?  2.?Dislocation of right elbow?S53.104A  ?Her x-rays from today still does not demonstrate a fracture.? For this reason?I will remove the cast and place her in a postop elbow brace.? I will also place in physical therapy for her elbow.? She will be limited to?full extension and?flexion to 90 degrees.? She will be sent to [facility] physical therapy?in [place].  Ordered:  Office/Outpatient Visit Level 3 Established [id number] PC, Right ankle instability  Dislocation of right elbow, [facility], 11/19/19 17:00:00 CST  XR Elbow Right 2 Views, Routine, 11/19/19 16:37:00 CST, Fall, None, Ambulatory, Rad Type, Dislocation of right elbow, Not Scheduled, 11/19/19 16:37:00 CST  ?   Problem List/Past Medical  History  Ongoing  Anxiety  Bilateral hand numbness  Bulging of cervical intervertebral disc  Cervicalgia  Dislocation of right elbow  Fatigue  Fibromyalgia  Hydromyelia  Insomnia  Migraine headache  Mitral valve prolapse  Nausea  OCD - Obsessive-compulsive disorder  Otitis media  Palpitations  Restless leg syndrome  Right ankle instability  Historical  Contraceptive surveillance  Sinusitis  Procedure/Surgical History  Surgical removal of wisdom tooth (08.2018)  Carpal tunnel release (12.2011)  Carpal tunnel release (11.2011)   Medications  Acidophilus Probiotic Blend oral capsule, 1 cap(s), Oral, Daily  aspirin 81 mg oral Delayed Release (EC) tablet, 81 mg= 1 tab(s), Oral, BID,? ?Not taking  busPIRone 15 mg oral tablet, 15 mg= 1 tab(s), Oral, BID, 3 refills,? ?Still taking, not as prescribed: once daily  cyclobenzaprine 10 mg oral tablet, See Instructions, 1 refills,? ?Still taking, not as prescribed: half tablet nightly per pt  Flonase 50 mcg/inh nasal spray, 1 spray(s), Nasal, Daily, 5 refills,? ?Still taking, not as prescribed: prn  Maxalt-MLT 5 mg oral tablet, disintegrating, 5 mg= 1 tab(s), Oral, Daily, PRN, 1 refills,? ?Not taking  Neurontin 300 mg oral capsule, 300 mg= 1 cap(s), Oral, TID,? ?Not taking  Percocet 5/325 oral tablet, 1 tab(s), Oral, q4hr, PRN,? ?Not taking  Phenergan 12.5 mg Tab, 12.5 mg= 1 tab(s), Oral, q6hr, PRN,? ?Not taking  potassium chloride 99 mg oral tablet, 99 mg= 1 tab(s), Oral, Daily  ropinirole 0.25 mg oral tablet, See Instructions, 2 refills  Vitamin B12 500 mcg oral tablet, 500 mcg= 1 tab(s), Oral, Daily  Vitamin D3 1000 intl units oral tablet, 1000 IntUnit= 1 tab(s), Oral, Daily  Zofran 4 mg oral tablet, 4 mg= 1 tab(s), Oral, q8hr, 2 refills,? ?Still taking, not as prescribed: prn  zolpidem 10 mg oral tablet, 10 mg= 1 tab(s), Oral, Once a day (at bedtime), 2 refills  zolpidem 6.25 mg oral tablet, extended release, 6.25 mg= 1 tab(s), Oral, qPM,? ?Not  taking  Allergies  penicillins?(Rash)  Social History  Abuse/Neglect  No, 11/19/2019  Alcohol  Current, 1-2 times per month, 11/19/2019  Employment/School  Employed, Work/School description: ., 01/11/2016  Exercise  Exercise duration: 30. Exercise frequency: 3-4 times/week. Self assessment: Good condition. Exercise type: Walking., 01/13/2016  Home/Environment  Lives with Spouse., 01/11/2016  Nutrition/Health  Regular, 01/13/2016  Sexual  Sexually active: Yes., 01/13/2016  Substance Use  Never, 11/19/2019  Tobacco  Never (less than 100 in lifetime), No, 11/19/2019  Family History  Asthma.: Mother.  Depression 27-APR-2016 23:33:46<$>: Mother.  Fibromyalgia: Mother.  Heart disease: Father.  Health Maintenance  Health Maintenance  ???Pending?(in the next year)  ??? ??Due?  ??? ? ? ?ADL Screening due??11/19/19??and every 1??year(s)  ??? ? ? ?Influenza Vaccine due??11/19/19??and every?  ??? ? ? ?Tetanus Vaccine due??11/19/19??and every 10??year(s)  ??? ??Due In Future?  ??? ? ? ?Alcohol Misuse Screening not due until??01/01/20??and every 1??year(s)  ??? ? ? ?Obesity Screening not due until??01/01/20??and every 1??year(s)  ??? ? ? ?Depression Screening not due until??04/07/20??and every 1??year(s)  ??? ? ? ?Cervical Cancer Screening not due until??10/10/20??and every 3??year(s)  ??? ? ? ?Blood Pressure Screening not due until??11/11/20??and every 1??year(s)  ??? ? ? ?Body Mass Index Check not due until??11/18/20??and every 1??year(s)  ???Satisfied?(in the past 1 year)  ??? ??Satisfied?  ??? ? ? ?Alcohol Misuse Screening on??10/01/19.??Satisfied by June Medina  ??? ? ? ?Blood Pressure Screening on??11/12/19.??Satisfied by Marla Garber  ??? ? ? ?Body Mass Index Check on??11/19/19.??Satisfied by Teressa Salas  ??? ? ? ?Depression Screening on??04/08/19.??Satisfied by Pao Hernandez  ??? ? ? ?Influenza Vaccine on??02/21/19.??Satisfied by Vince Mcelroy LPN  ??? ? ? ?Obesity Screening  on??11/19/19.??Satisfied by Teressa Salas  ?     [1]?Office Visit Note; Siddharth Mireles MD R 11/12/2019 16:23 CST  [2]?Office Visit Note; Siddharth Mireles MD 11/12/2019 16:23 CST

## 2022-05-05 NOTE — HISTORICAL [facility]
This is a historical note converted from [employer]. Formatting and pictures may have been removed.  Please reference [employer] for original formatting and attached multimedia. Chief Complaint  lower abdominal this morning/back 3 days, weaknees , tried flexeril,  History of Present Illness  Bilateral-year-old presents with a history of back pain on and off for 3 days.? Initially had loose watery stools for 2 days.? No nausea no vomiting.? Felt regular BM last night.? Lower abdominal?pain and pressure since 7 AM today.? LMP 2/11/2019. ?Currently on birth control pills.? No vaginal discharge, no vaginal bleeding.? Denies?urinary frequency or urgency or burning urination.? No fever.? History of kidney stones in the past.  Review of Systems  Constitutional : Negative for fever,?Negative for weakness  HEENT : No sore throat,No earache  Neck : Negative except HPI  Respiratory : Negative for shortness of breath and wheezing  Cardiovascular : Negative for chest pain, no palpitations?no edema  Gastrointestinal : Negative except as documented in HPI  Integumentary : Negative for skin rash  Physical Exam  Vitals & Measurements  T:?36.9? ?C (Oral)? HR:?85(Peripheral)? RR:?17? BP:?121/86? SpO2:?100%?  HT:?152?cm? WT:?52.9?kg? BMI:?22.9?  General : Alert and Oriented, No apparent distress, afebrile  Neck - supple  Respiratory :?Bilateral equal breath sounds,??clear to auscultate?  Cardiovascular :?Rate rhythm regular, normal volume pulse  Gastrointestinal : Full abdomen,?lower quadrants?mild diffuse tenderness to palpate.? No guarding or rigidity.? No rebound tenderness, BS X 4 quadrants - normal  Genitourinary :?No Bilateral CVA tenderness  Integumentary : Warm, Dry and no rash  Assessment/Plan  1.?Constipation?K59.00  ? Review the x-rays, consults of constipation on nonspecific gas pattern.? Adequate hydration.? Prilosec over-the-counter?for gastric symptoms.  Monitor the symptoms, with worsening symptoms?ER precautions. ?Voiced  understanding.? Call this clinic for any questions  Ordered:  lactulose, 10 gm = 15 mL, Oral, BID, X 7 day(s), # 210 mL, 0 Refill(s), Pharmacy: [facility] [id number] [id number]  Office/Outpatient Visit Level 3 Established [id number] PC, Constipation  Lower abdominal pain, [facility], 02/21/19 14:18:00 CST  ?  2.?Lower abdominal pain?R10.30  Ordered:  Office/Outpatient Visit Level 3 Established [id number] PC, Constipation  Lower abdominal pain, [facility], 02/21/19 14:18:00 CST  ?  Dysuria?R30.0  ? Urine dipstick appears normal  ?   Problem List/Past Medical History  Ongoing  Anxiety  Fatigue  Fibromyalgia  Insomnia  Migraine headache  Mitral valve prolapse  OCD - Obsessive-compulsive disorder  Palpitations  Restless leg syndrome  Historical  Contraceptive surveillance  Sinusitis  Procedure/Surgical History  Carpal tunnel (01/10/2011)  wisdom teeth   Medications  Acidophilus Probiotic Blend oral capsule, 1 cap(s), Oral, Daily  Blisovi FE 1.5/30 oral tablet, See Instructions, 4 refills  busPIRone 15 mg oral tablet, 15 mg= 1 tab(s), Oral, BID, 5 refills  cyclobenzaprine 10 mg oral tablet, See Instructions, 1 refills  lactulose 10 g/15 mL oral syrup, 10 gm= 15 mL, Oral, BID  metoprolol succinate 25 mg oral tablet extended release, 25 mg= 1 tab(s), Oral, Daily  Multivitamins and Minerals oral tablet  potassium chloride 99 mg oral tablet, 99 mg= 1 tab(s), Oral, Daily  ropinirole 0.25 mg oral tablet, See Instructions, 2 refills  Vitamin B12 500 mcg oral tablet, 500 mcg= 1 tab(s), Oral, Daily  Vitamin D3 1000 intl units oral tablet, 1000 IntUnit= 1 tab(s), Oral, Daily  zolpidem 10 mg oral tablet, 10 mg= 1 tab(s), Oral, Once a day (at bedtime), 2 refills  Allergies  penicillins?(Rash)  Social History  Alcohol  Current, Beer, 1-2 times per month, 01/11/2016  Employment/School  Employed, Work/School description: ., 01/11/2016  Exercise  Exercise duration: 30. Exercise frequency: 3-4 times/week. Self assessment: Good condition. Exercise type:  Walking., 01/13/2016  Home/Environment  Lives with Spouse., 01/11/2016  Nutrition/Health  Regular, 01/13/2016  Sexual  Sexually active: Yes., 01/13/2016  Substance Abuse  Never, 01/11/2016  Tobacco  Never (less than 100 in lifetime), N/A, 02/21/2019  Family History  Asthma.: Mother.  Depression 27-APR-2016 23:33:46<$>: Mother.  Fibromyalgia: Mother.  Heart disease: Father.  Health Maintenance  Health Maintenance  ???Pending?(in the next year)  ??? ??Due?  ??? ? ? ?ADL Screening due??02/21/19??and every 1??year(s)  ??? ? ? ?Tetanus Vaccine due??02/21/19??and every 10??year(s)  ??? ??Due In Future?  ??? ? ? ?Depression Screening not due until??11/28/19??and every 1??year(s)  ??? ? ? ?Alcohol Misuse Screening not due until??11/28/19??and every 1??year(s)  ???Satisfied?(in the past 1 year)  ??? ??Satisfied?  ??? ? ? ?Alcohol Misuse Screening on??11/28/18.??Satisfied by Zoraida Tavera LPN  ??? ? ? ?Blood Pressure Screening on??02/21/19.??Satisfied by Vince Mcelroy LPN  ??? ? ? ?Body Mass Index Check on??02/21/19.??Satisfied by Vince Mcelroy LPN  ??? ? ? ?Depression Screening on??11/28/18.??Satisfied by Zoraida Tavera LPN  ??? ? ? ?Influenza Vaccine on??02/21/19.??Satisfied by Vince Mcelroy LPN  ??? ? ? ?Obesity Screening on??02/21/19.??Satisfied by Vince Mcelroy LPN  ?  ?

## 2022-09-21 ENCOUNTER — HISTORICAL (OUTPATIENT)
Dept: ADMINISTRATIVE | Facility: HOSPITAL | Age: 37
End: 2022-09-21
Payer: COMMERCIAL

## 2022-11-16 ENCOUNTER — OFFICE VISIT (OUTPATIENT)
Dept: URGENT CARE | Facility: CLINIC | Age: 37
End: 2022-11-16
Payer: COMMERCIAL

## 2022-11-16 ENCOUNTER — PATIENT MESSAGE (OUTPATIENT)
Dept: ADMINISTRATIVE | Facility: OTHER | Age: 37
End: 2022-11-16
Payer: COMMERCIAL

## 2022-11-16 VITALS
WEIGHT: 115 LBS | HEART RATE: 83 BPM | TEMPERATURE: 98 F | DIASTOLIC BLOOD PRESSURE: 79 MMHG | OXYGEN SATURATION: 98 % | SYSTOLIC BLOOD PRESSURE: 114 MMHG | BODY MASS INDEX: 22.58 KG/M2 | RESPIRATION RATE: 17 BRPM | HEIGHT: 60 IN

## 2022-11-16 DIAGNOSIS — J00 ACUTE NASOPHARYNGITIS: Primary | ICD-10-CM

## 2022-11-16 DIAGNOSIS — R52 BODY ACHES: ICD-10-CM

## 2022-11-16 LAB
CTP QC/QA: YES
CTP QC/QA: YES
MOLECULAR STREP A: NEGATIVE
POC MOLECULAR INFLUENZA A AGN: NEGATIVE
POC MOLECULAR INFLUENZA B AGN: NEGATIVE

## 2022-11-16 PROCEDURE — 1159F PR MEDICATION LIST DOCUMENTED IN MEDICAL RECORD: ICD-10-PCS | Mod: CPTII,,, | Performed by: FAMILY MEDICINE

## 2022-11-16 PROCEDURE — 3008F BODY MASS INDEX DOCD: CPT | Mod: CPTII,,, | Performed by: FAMILY MEDICINE

## 2022-11-16 PROCEDURE — 3008F PR BODY MASS INDEX (BMI) DOCUMENTED: ICD-10-PCS | Mod: CPTII,,, | Performed by: FAMILY MEDICINE

## 2022-11-16 PROCEDURE — 1159F MED LIST DOCD IN RCRD: CPT | Mod: CPTII,,, | Performed by: FAMILY MEDICINE

## 2022-11-16 PROCEDURE — 3078F DIAST BP <80 MM HG: CPT | Mod: CPTII,,, | Performed by: FAMILY MEDICINE

## 2022-11-16 PROCEDURE — 1160F RVW MEDS BY RX/DR IN RCRD: CPT | Mod: CPTII,,, | Performed by: FAMILY MEDICINE

## 2022-11-16 PROCEDURE — 87502 POCT INFLUENZA A/B MOLECULAR: ICD-10-PCS | Mod: QW,,, | Performed by: FAMILY MEDICINE

## 2022-11-16 PROCEDURE — 99213 OFFICE O/P EST LOW 20 MIN: CPT | Mod: ,,, | Performed by: FAMILY MEDICINE

## 2022-11-16 PROCEDURE — 87651 POCT STREP A MOLECULAR: ICD-10-PCS | Mod: QW,,, | Performed by: FAMILY MEDICINE

## 2022-11-16 PROCEDURE — 3074F SYST BP LT 130 MM HG: CPT | Mod: CPTII,,, | Performed by: FAMILY MEDICINE

## 2022-11-16 PROCEDURE — 87651 STREP A DNA AMP PROBE: CPT | Mod: QW,,, | Performed by: FAMILY MEDICINE

## 2022-11-16 PROCEDURE — 99213 PR OFFICE/OUTPT VISIT, EST, LEVL III, 20-29 MIN: ICD-10-PCS | Mod: ,,, | Performed by: FAMILY MEDICINE

## 2022-11-16 PROCEDURE — 87502 INFLUENZA DNA AMP PROBE: CPT | Mod: QW,,, | Performed by: FAMILY MEDICINE

## 2022-11-16 PROCEDURE — 3074F PR MOST RECENT SYSTOLIC BLOOD PRESSURE < 130 MM HG: ICD-10-PCS | Mod: CPTII,,, | Performed by: FAMILY MEDICINE

## 2022-11-16 PROCEDURE — 1160F PR REVIEW ALL MEDS BY PRESCRIBER/CLIN PHARMACIST DOCUMENTED: ICD-10-PCS | Mod: CPTII,,, | Performed by: FAMILY MEDICINE

## 2022-11-16 PROCEDURE — 3078F PR MOST RECENT DIASTOLIC BLOOD PRESSURE < 80 MM HG: ICD-10-PCS | Mod: CPTII,,, | Performed by: FAMILY MEDICINE

## 2022-11-16 RX ORDER — CYCLOBENZAPRINE HCL 10 MG
10 TABLET ORAL 3 TIMES DAILY PRN
COMMUNITY

## 2022-11-16 RX ORDER — MULTIVIT WITH MINERALS/HERBS
1 TABLET ORAL DAILY
COMMUNITY

## 2022-11-16 RX ORDER — PREDNISONE 20 MG/1
20 TABLET ORAL 2 TIMES DAILY
Qty: 6 TABLET | Refills: 0 | Status: SHIPPED | OUTPATIENT
Start: 2022-11-16 | End: 2022-11-19

## 2022-11-16 RX ORDER — MULTIVITAMIN
1 TABLET ORAL DAILY
COMMUNITY

## 2022-11-16 NOTE — PATIENT INSTRUCTIONS
Discussed the physical finding , concerns of viral etiology, possible early testing examination.  Monitor the symptoms, adequate hydration and rest.  Flu test negative, strep test negative, home COVID-19 test negative per patient  Prednisone for symptom relief as needed.  Tylenol and ibuprofen for fever, body aches and sore throat.   Warm saltwater gargles for sore throat.   Claritin 10 mg or Zyrtec 10 mg for nasal congestion  Robitussin-DM for cough and cold as needed and as directed  Call or return to clinic for any questions

## 2022-11-16 NOTE — PROGRESS NOTES
Subjective:       Patient ID: Ashwini Trujillo is a 37 y.o. female.    Vitals:  height is 5' (1.524 m) and weight is 52.2 kg (115 lb). Her temperature is 98.3 °F (36.8 °C). Her blood pressure is 114/79 and her pulse is 83. Her respiration is 17 and oxygen saturation is 98%.     Chief Complaint: Headache (Sunday headache and body aches. Monday worsening headache. Yesterday lymph nodes in neck swollen and hurts. Ear pressure, body aches. Tried taking her prescription medications she takes for her migraines with no relief. At home covid test negative.)    HPI:  37-year-old female present to clinic with concerns of body aches, headache since 2-3 days.  Yesterday noticed swollen painful lymph nodes in the neck.  Over-the-counter medications some help.  No concerns of positive exposure to infections.  Home COVID-19 test negative per patient.    ROS  :  Constitutional : _ no measured fever at home, + body aches, positive for headache   HENT_sore throat, postnasal drainage  Respiratory_no wheezing, no shortness of breath  Cardiovascular_no chest pain  Gastrointestinal_ No vomiting, No diarrhea, No abdominal pain  Musculoskeletal_no joint pain, no joint swelling  Integumentary_no skin rash     Objective:      Physical Exam      Assessment:       1. Acute nasopharyngitis    2. Body aches            Plan:     Discussed the physical finding , concerns of viral etiology, possible early testing examination.  Monitor the symptoms, adequate hydration and rest.  Flu test negative, strep test negative, home COVID-19 test negative per patient  Prednisone for symptom relief as needed.  Tylenol and ibuprofen for fever, body aches and sore throat.   Warm saltwater gargles for sore throat.   Claritin 10 mg or Zyrtec 10 mg for nasal congestion  Robitussin-DM for cough and cold as needed and as directed  Call or return to clinic for any questions    Acute nasopharyngitis  -     predniSONE (DELTASONE) 20 MG tablet; Take 1 tablet (20 mg  total) by mouth 2 (two) times daily. for 3 days  Dispense: 6 tablet; Refill: 0    Body aches  -     POCT Influenza A/B Molecular  -     POCT Strep A, Molecular

## 2023-01-05 ENCOUNTER — OFFICE VISIT (OUTPATIENT)
Dept: FAMILY MEDICINE | Facility: CLINIC | Age: 38
End: 2023-01-05
Payer: COMMERCIAL

## 2023-01-05 VITALS
WEIGHT: 122.69 LBS | TEMPERATURE: 99 F | DIASTOLIC BLOOD PRESSURE: 82 MMHG | HEIGHT: 60 IN | OXYGEN SATURATION: 99 % | RESPIRATION RATE: 16 BRPM | BODY MASS INDEX: 24.09 KG/M2 | SYSTOLIC BLOOD PRESSURE: 114 MMHG | HEART RATE: 87 BPM

## 2023-01-05 DIAGNOSIS — G47.00 INSOMNIA, UNSPECIFIED TYPE: ICD-10-CM

## 2023-01-05 DIAGNOSIS — E55.9 HYPOVITAMINOSIS D: ICD-10-CM

## 2023-01-05 DIAGNOSIS — Z28.21 INFLUENZA VACCINATION DECLINED BY PATIENT: ICD-10-CM

## 2023-01-05 DIAGNOSIS — M79.7 FIBROMYALGIA: ICD-10-CM

## 2023-01-05 DIAGNOSIS — F41.9 ANXIETY: ICD-10-CM

## 2023-01-05 DIAGNOSIS — Z00.00 WELLNESS EXAMINATION: ICD-10-CM

## 2023-01-05 DIAGNOSIS — G25.81 RESTLESS LEG SYNDROME: ICD-10-CM

## 2023-01-05 DIAGNOSIS — G43.809 OTHER MIGRAINE WITHOUT STATUS MIGRAINOSUS, NOT INTRACTABLE: ICD-10-CM

## 2023-01-05 PROCEDURE — 3008F BODY MASS INDEX DOCD: CPT | Mod: CPTII,,, | Performed by: FAMILY MEDICINE

## 2023-01-05 PROCEDURE — 3079F PR MOST RECENT DIASTOLIC BLOOD PRESSURE 80-89 MM HG: ICD-10-PCS | Mod: CPTII,,, | Performed by: FAMILY MEDICINE

## 2023-01-05 PROCEDURE — 3074F SYST BP LT 130 MM HG: CPT | Mod: CPTII,,, | Performed by: FAMILY MEDICINE

## 2023-01-05 PROCEDURE — 1160F RVW MEDS BY RX/DR IN RCRD: CPT | Mod: CPTII,,, | Performed by: FAMILY MEDICINE

## 2023-01-05 PROCEDURE — 99395 PREV VISIT EST AGE 18-39: CPT | Mod: ,,, | Performed by: FAMILY MEDICINE

## 2023-01-05 PROCEDURE — 3008F PR BODY MASS INDEX (BMI) DOCUMENTED: ICD-10-PCS | Mod: CPTII,,, | Performed by: FAMILY MEDICINE

## 2023-01-05 PROCEDURE — 99395 PR PREVENTIVE VISIT,EST,18-39: ICD-10-PCS | Mod: ,,, | Performed by: FAMILY MEDICINE

## 2023-01-05 PROCEDURE — 1159F PR MEDICATION LIST DOCUMENTED IN MEDICAL RECORD: ICD-10-PCS | Mod: CPTII,,, | Performed by: FAMILY MEDICINE

## 2023-01-05 PROCEDURE — 3074F PR MOST RECENT SYSTOLIC BLOOD PRESSURE < 130 MM HG: ICD-10-PCS | Mod: CPTII,,, | Performed by: FAMILY MEDICINE

## 2023-01-05 PROCEDURE — 3079F DIAST BP 80-89 MM HG: CPT | Mod: CPTII,,, | Performed by: FAMILY MEDICINE

## 2023-01-05 PROCEDURE — 1159F MED LIST DOCD IN RCRD: CPT | Mod: CPTII,,, | Performed by: FAMILY MEDICINE

## 2023-01-05 PROCEDURE — 1160F PR REVIEW ALL MEDS BY PRESCRIBER/CLIN PHARMACIST DOCUMENTED: ICD-10-PCS | Mod: CPTII,,, | Performed by: FAMILY MEDICINE

## 2023-01-05 RX ORDER — KETOROLAC TROMETHAMINE 10 MG/1
10 TABLET, FILM COATED ORAL EVERY 6 HOURS
Qty: 20 TABLET | Refills: 0 | Status: SHIPPED | OUTPATIENT
Start: 2023-01-05 | End: 2023-01-10

## 2023-01-05 NOTE — PROGRESS NOTES
Subjective:        Patient ID: Ashwini Trujillo is a 37 y.o. female.    Chief Complaint: Annual Exam (Wellness/Needs lab orders )        Patient presents to the clinic unaccompanied today for her wellness visit.  She is due for lab work.    She has a history of anxiety and was on BuSpar. Not taking since has been on medical marijuana from dr. hayes. Doing telemed visit once yearly. Doing well.     She has a history of insomnia and restless legs for which she was on Ambien 10 mg. Her last fill was 3/2022.  She was also on ropinirole.   Now doing medical marijuana.  Working well.     She has a history migraines and fibromyalgia.  Also has cervicogenic headaches.  She has tried Toradol in the past which helps the most for this.  Flexeril also helps.  Maybe takes once month. Asking for refill of toradol.  She has taken Maxalt in the past for her migraines    She has low vitamin d and supplements otc    She has a history of mitral valve prolapse.    Her last Pap smear was January 2021 which was normal and HPV negative.    She is allergic to penicillin.  She does not smoke.  She drinks alcohol on occasion.  She declines immunizations today    Review of Systems   Constitutional: Negative.    HENT: Negative.     Respiratory: Negative.     Cardiovascular: Negative.    Gastrointestinal: Negative.    Genitourinary: Negative.        Review of patient's allergies indicates:   Allergen Reactions    Pcn [penicillins] Rash      Vitals:    01/05/23 1421   BP: 114/82   BP Location: Left arm   Pulse: 87   Resp: 16   Temp: 98.8 °F (37.1 °C)   TempSrc: Temporal   SpO2: 99%   Weight: 55.7 kg (122 lb 11.2 oz)   Height: 5' (1.524 m)      Social History     Socioeconomic History    Marital status:    Tobacco Use    Smoking status: Never    Smokeless tobacco: Never   Substance and Sexual Activity    Alcohol use: Yes    Drug use: Never      Family History   Problem Relation Age of Onset    Hypertension Mother     Lupus Mother      Thyroid disease Mother     Hypertension Father     Heart attack Father     No Known Problems Sister     No Known Problems Brother           Objective:     Physical Exam  Vitals and nursing note reviewed.   Constitutional:       Appearance: Normal appearance. She is normal weight.   HENT:      Head: Normocephalic and atraumatic.      Nose: Nose normal.      Mouth/Throat:      Mouth: Mucous membranes are moist.      Pharynx: Oropharynx is clear.   Eyes:      Extraocular Movements: Extraocular movements intact.   Cardiovascular:      Rate and Rhythm: Normal rate and regular rhythm.      Pulses: Normal pulses.      Heart sounds: Normal heart sounds.   Pulmonary:      Effort: Pulmonary effort is normal.      Breath sounds: Normal breath sounds.   Musculoskeletal:         General: Normal range of motion.      Cervical back: Normal range of motion.   Skin:     General: Skin is warm and dry.   Neurological:      General: No focal deficit present.      Mental Status: She is alert and oriented to person, place, and time. Mental status is at baseline.   Psychiatric:         Mood and Affect: Mood normal.     Current Outpatient Medications on File Prior to Visit   Medication Sig Dispense Refill    b complex vitamins tablet Take 1 tablet by mouth once daily.      BIOTIN, BULK, MISC by Misc.(Non-Drug; Combo Route) route.      cyclobenzaprine (FLEXERIL) 10 MG tablet Take 10 mg by mouth 3 (three) times daily as needed for Muscle spasms.      ergocalciferol, vitamin D2, (VITAMIN D ORAL) Take by mouth.      multivitamin (THERAGRAN) per tablet Take 1 tablet by mouth once daily.      [DISCONTINUED] ketorolac tromethamine (TORADOL ORAL) Take by mouth. PRN Migraine       No current facility-administered medications on file prior to visit.     Health Maintenance   Topic Date Due    Hepatitis C Screening  Never done    TETANUS VACCINE  01/05/2024 (Originally 10/13/2002)    Lipid Panel  Completed      Results for orders placed or performed  in visit on 11/16/22   POCT Influenza A/B Molecular   Result Value Ref Range    POC Molecular Influenza A Ag Negative Negative, Not Reported    POC Molecular Influenza B Ag Negative Negative, Not Reported     Acceptable Yes    POCT Strep A, Molecular   Result Value Ref Range    Molecular Strep A, POC Negative Negative     Acceptable Yes           Assessment & Plan:     Active Problem List with Overview Notes    Diagnosis Date Noted    Wellness examination 01/05/2023    Influenza vaccination declined by patient 01/05/2023    Hypovitaminosis D 01/05/2023    Fibromyalgia     Anxiety     Migraine     Restless leg syndrome     Insomnia        1. Wellness examination  Assessment & Plan:  Fasting labs ordered. Will call with results when available  Pap 1/2021  Declines immunizations today    Orders:  -     CBC Auto Differential; Future; Expected date: 01/05/2023  -     Comprehensive Metabolic Panel; Future; Expected date: 01/05/2023  -     Lipid Panel; Future; Expected date: 01/05/2023  -     TSH; Future; Expected date: 01/05/2023  -     Cancel: Urinalysis; Future; Expected date: 01/05/2023  -     Hepatitis C Antibody; Future; Expected date: 01/05/2023  -     HIV 1/2 Ag/Ab (4th Gen); Future; Expected date: 01/05/2023  -     Vitamin D; Future; Expected date: 01/05/2023    2. Fibromyalgia  Assessment & Plan:  Stable on current prescriptions prn - flexeril and toradol     Orders:  -     CBC Auto Differential; Future; Expected date: 01/05/2023  -     Comprehensive Metabolic Panel; Future; Expected date: 01/05/2023  -     Lipid Panel; Future; Expected date: 01/05/2023  -     TSH; Future; Expected date: 01/05/2023  -     Cancel: Urinalysis; Future; Expected date: 01/05/2023  -     Hepatitis C Antibody; Future; Expected date: 01/05/2023  -     HIV 1/2 Ag/Ab (4th Gen); Future; Expected date: 01/05/2023  -     Vitamin D; Future; Expected date: 01/05/2023    3. Anxiety  Assessment & Plan:  Doing well on  no prescriptions.  On medical marijuana from other provider    Orders:  -     CBC Auto Differential; Future; Expected date: 01/05/2023  -     Comprehensive Metabolic Panel; Future; Expected date: 01/05/2023  -     Lipid Panel; Future; Expected date: 01/05/2023  -     TSH; Future; Expected date: 01/05/2023  -     Cancel: Urinalysis; Future; Expected date: 01/05/2023  -     Hepatitis C Antibody; Future; Expected date: 01/05/2023  -     HIV 1/2 Ag/Ab (4th Gen); Future; Expected date: 01/05/2023  -     Vitamin D; Future; Expected date: 01/05/2023    4. Other migraine without status migrainosus, not intractable  Assessment & Plan:  Not on maxalt. Taking flexeril and toradol as needed. Likely cervicogenic.  Doing well.     Orders:  -     CBC Auto Differential; Future; Expected date: 01/05/2023  -     Comprehensive Metabolic Panel; Future; Expected date: 01/05/2023  -     Lipid Panel; Future; Expected date: 01/05/2023  -     TSH; Future; Expected date: 01/05/2023  -     Cancel: Urinalysis; Future; Expected date: 01/05/2023  -     Hepatitis C Antibody; Future; Expected date: 01/05/2023  -     HIV 1/2 Ag/Ab (4th Gen); Future; Expected date: 01/05/2023  -     Vitamin D; Future; Expected date: 01/05/2023    5. Restless leg syndrome  Assessment & Plan:  Doing well on no medications    Orders:  -     CBC Auto Differential; Future; Expected date: 01/05/2023  -     Comprehensive Metabolic Panel; Future; Expected date: 01/05/2023  -     Lipid Panel; Future; Expected date: 01/05/2023  -     TSH; Future; Expected date: 01/05/2023  -     Cancel: Urinalysis; Future; Expected date: 01/05/2023  -     Hepatitis C Antibody; Future; Expected date: 01/05/2023  -     HIV 1/2 Ag/Ab (4th Gen); Future; Expected date: 01/05/2023  -     Vitamin D; Future; Expected date: 01/05/2023    6. Insomnia, unspecified type  Assessment & Plan:  No longer taking ambien since started medical marijuana.  Doing well.     Orders:  -     CBC Auto Differential;  Future; Expected date: 01/05/2023  -     Comprehensive Metabolic Panel; Future; Expected date: 01/05/2023  -     Lipid Panel; Future; Expected date: 01/05/2023  -     TSH; Future; Expected date: 01/05/2023  -     Cancel: Urinalysis; Future; Expected date: 01/05/2023  -     Hepatitis C Antibody; Future; Expected date: 01/05/2023  -     HIV 1/2 Ag/Ab (4th Gen); Future; Expected date: 01/05/2023  -     Vitamin D; Future; Expected date: 01/05/2023    7. Influenza vaccination declined by patient  Assessment & Plan:  Declines vaccinations today    Orders:  -     Vitamin D; Future; Expected date: 01/05/2023    8. Hypovitaminosis D  Assessment & Plan:  Continue to supplement otc    Orders:  -     Vitamin D; Future; Expected date: 01/05/2023    Other orders  -     ketorolac (TORADOL) 10 mg tablet; Take 1 tablet (10 mg total) by mouth every 6 (six) hours. With food as needed for pain for 5 days  Dispense: 20 tablet; Refill: 0         Follow up in about 1 year (around 1/5/2024) for Wellness with Labs.

## 2023-01-05 NOTE — ASSESSMENT & PLAN NOTE
Fasting labs ordered. Will call with results when available  Pap 1/2021  Declines immunizations today

## 2023-01-07 ENCOUNTER — DOCUMENTATION ONLY (OUTPATIENT)
Dept: INTERNAL MEDICINE | Facility: CLINIC | Age: 38
End: 2023-01-07

## 2023-02-03 ENCOUNTER — LAB VISIT (OUTPATIENT)
Dept: LAB | Facility: HOSPITAL | Age: 38
End: 2023-02-03
Attending: FAMILY MEDICINE
Payer: COMMERCIAL

## 2023-02-03 DIAGNOSIS — G25.81 RESTLESS LEG SYNDROME: ICD-10-CM

## 2023-02-03 DIAGNOSIS — Z28.21 INFLUENZA VACCINATION DECLINED BY PATIENT: ICD-10-CM

## 2023-02-03 DIAGNOSIS — E55.9 HYPOVITAMINOSIS D: ICD-10-CM

## 2023-02-03 DIAGNOSIS — Z00.00 WELLNESS EXAMINATION: ICD-10-CM

## 2023-02-03 DIAGNOSIS — G47.00 INSOMNIA, UNSPECIFIED TYPE: ICD-10-CM

## 2023-02-03 DIAGNOSIS — G43.809 OTHER MIGRAINE WITHOUT STATUS MIGRAINOSUS, NOT INTRACTABLE: ICD-10-CM

## 2023-02-03 DIAGNOSIS — M79.7 FIBROMYALGIA: ICD-10-CM

## 2023-02-03 DIAGNOSIS — F41.9 ANXIETY: ICD-10-CM

## 2023-02-03 LAB
ALBUMIN SERPL-MCNC: 4.3 G/DL (ref 3.5–5)
ALBUMIN/GLOB SERPL: 1.5 RATIO (ref 1.1–2)
ALP SERPL-CCNC: 56 UNIT/L (ref 40–150)
ALT SERPL-CCNC: 15 UNIT/L (ref 0–55)
APPEARANCE UR: CLEAR
AST SERPL-CCNC: 19 UNIT/L (ref 5–34)
BASOPHILS # BLD AUTO: 0.05 X10(3)/MCL (ref 0–0.2)
BASOPHILS NFR BLD AUTO: 0.9 %
BILIRUB UR QL STRIP.AUTO: NEGATIVE MG/DL
BILIRUBIN DIRECT+TOT PNL SERPL-MCNC: 0.9 MG/DL
BUN SERPL-MCNC: 8.7 MG/DL (ref 7–18.7)
CALCIUM SERPL-MCNC: 9.5 MG/DL (ref 8.4–10.2)
CHLORIDE SERPL-SCNC: 107 MMOL/L (ref 98–107)
CHOLEST SERPL-MCNC: 134 MG/DL
CHOLEST/HDLC SERPL: 2 {RATIO} (ref 0–5)
CO2 SERPL-SCNC: 26 MMOL/L (ref 22–29)
COLOR UR AUTO: YELLOW
CREAT SERPL-MCNC: 0.81 MG/DL (ref 0.55–1.02)
DEPRECATED CALCIDIOL+CALCIFEROL SERPL-MC: 83 NG/ML (ref 30–80)
EOSINOPHIL # BLD AUTO: 0.03 X10(3)/MCL (ref 0–0.9)
EOSINOPHIL NFR BLD AUTO: 0.6 %
ERYTHROCYTE [DISTWIDTH] IN BLOOD BY AUTOMATED COUNT: 12.2 % (ref 11.5–17)
GFR SERPLBLD CREATININE-BSD FMLA CKD-EPI: >60 MLS/MIN/1.73/M2
GLOBULIN SER-MCNC: 2.9 GM/DL (ref 2.4–3.5)
GLUCOSE SERPL-MCNC: 95 MG/DL (ref 74–100)
GLUCOSE UR QL STRIP.AUTO: NEGATIVE MG/DL
HCT VFR BLD AUTO: 40.4 % (ref 37–47)
HCV AB SERPL QL IA: NONREACTIVE
HDLC SERPL-MCNC: 59 MG/DL (ref 35–60)
HGB BLD-MCNC: 13.8 GM/DL (ref 12–16)
HIV 1+2 AB+HIV1 P24 AG SERPL QL IA: NONREACTIVE
IMM GRANULOCYTES # BLD AUTO: 0.01 X10(3)/MCL (ref 0–0.04)
IMM GRANULOCYTES NFR BLD AUTO: 0.2 %
KETONES UR QL STRIP.AUTO: NEGATIVE MG/DL
LDLC SERPL CALC-MCNC: 63 MG/DL (ref 50–140)
LEUKOCYTE ESTERASE UR QL STRIP.AUTO: NEGATIVE UNIT/L
LYMPHOCYTES # BLD AUTO: 1.99 X10(3)/MCL (ref 0.6–4.6)
LYMPHOCYTES NFR BLD AUTO: 37.6 %
MCH RBC QN AUTO: 33.3 PG
MCHC RBC AUTO-ENTMCNC: 34.2 MG/DL (ref 33–36)
MCV RBC AUTO: 97.3 FL (ref 80–94)
MONOCYTES # BLD AUTO: 0.33 X10(3)/MCL (ref 0.1–1.3)
MONOCYTES NFR BLD AUTO: 6.2 %
NEUTROPHILS # BLD AUTO: 2.88 X10(3)/MCL (ref 2.1–9.2)
NEUTROPHILS NFR BLD AUTO: 54.5 %
NITRITE UR QL STRIP.AUTO: NEGATIVE
NRBC BLD AUTO-RTO: 0 %
PH UR STRIP.AUTO: 7 [PH]
PLATELET # BLD AUTO: 259 X10(3)/MCL (ref 130–400)
PMV BLD AUTO: 10.1 FL (ref 7.4–10.4)
POTASSIUM SERPL-SCNC: 4.4 MMOL/L (ref 3.5–5.1)
PROT SERPL-MCNC: 7.2 GM/DL (ref 6.4–8.3)
PROT UR QL STRIP.AUTO: NEGATIVE MG/DL
RBC # BLD AUTO: 4.15 X10(6)/MCL (ref 4.2–5.4)
RBC UR QL AUTO: NEGATIVE UNIT/L
SODIUM SERPL-SCNC: 141 MMOL/L (ref 136–145)
SP GR UR STRIP.AUTO: 1.02
TRIGL SERPL-MCNC: 61 MG/DL (ref 37–140)
TSH SERPL-ACNC: 1.27 UIU/ML (ref 0.35–4.94)
UROBILINOGEN UR STRIP-ACNC: 0.2 MG/DL
VLDLC SERPL CALC-MCNC: 12 MG/DL
WBC # SPEC AUTO: 5.3 X10(3)/MCL (ref 4.5–11.5)

## 2023-02-03 PROCEDURE — 86803 HEPATITIS C AB TEST: CPT

## 2023-02-03 PROCEDURE — 80061 LIPID PANEL: CPT

## 2023-02-03 PROCEDURE — 82306 VITAMIN D 25 HYDROXY: CPT

## 2023-02-03 PROCEDURE — 85025 COMPLETE CBC W/AUTO DIFF WBC: CPT

## 2023-02-03 PROCEDURE — 36415 COLL VENOUS BLD VENIPUNCTURE: CPT

## 2023-02-03 PROCEDURE — 84443 ASSAY THYROID STIM HORMONE: CPT

## 2023-02-03 PROCEDURE — 87389 HIV-1 AG W/HIV-1&-2 AB AG IA: CPT

## 2023-02-03 PROCEDURE — 80053 COMPREHEN METABOLIC PANEL: CPT

## 2023-02-03 NOTE — PROGRESS NOTES
Please inform patient of results.    1. Vit d is elevated. Hold vit d x 1 month and then can resume at 1/2 original dose  2. No anemia but mcv is still elevated but improved. Can supplement with b complex otc    Other labwork within acceptable ranges.

## 2023-04-10 PROBLEM — Z00.00 WELLNESS EXAMINATION: Status: RESOLVED | Noted: 2023-01-05 | Resolved: 2023-04-10

## 2024-01-05 NOTE — PROGRESS NOTES
Subjective:        Patient ID: Ashwini Trujillo is a 38 y.o. female.    Chief Complaint: Annual Exam (Wellness )      Patient presents to the clinic unaccompanied today for her wellness visit.  She is due for labs.     She had flu at beginning of this month.       She has low vitamin d and supplements otc. Level 2/2023 is elevated.     She has a history of mitral valve prolapse.    She has a history of anxiety and was on BuSpar. Not taking since has been on medical marijuana from dr. hayes. Doing telemed visit once yearly. Doing well.      She has a history of insomnia and restless legs for which she was on Ambien 10 mg. Her last fill was 3/2022.  She was also on ropinirole.   Now doing medical marijuana.  Working well.      She has a history migraines and fibromyalgia.  Also has cervicogenic headaches.  She has tried Toradol in the past which helps the most for this.  Flexeril also helps.  Maybe takes once month. Asking for refill of toradol.  She has taken Maxalt in the past for her migraines     Her last Pap smear was 1/2021 which was normal and HPV negative. Has appt with dr. Ewing 2/2024     She is allergic to penicillin.  She does not smoke.  She drinks alcohol on occasion.  She declines immunizations today        Review of Systems   Constitutional: Negative.    HENT: Negative.     Respiratory: Negative.     Cardiovascular: Negative.    Gastrointestinal: Negative.    Genitourinary: Negative.          Review of patient's allergies indicates:   Allergen Reactions    Pcn [penicillins] Rash      Vitals:    01/08/24 0813   BP: 128/78   BP Location: Left arm   Pulse: 83   Resp: 16   Temp: 98.8 °F (37.1 °C)   TempSrc: Temporal   SpO2: 98%   Weight: 54.6 kg (120 lb 6.4 oz)   Height: 5' (1.524 m)      Social History     Socioeconomic History    Marital status:    Tobacco Use    Smoking status: Never    Smokeless tobacco: Never   Substance and Sexual Activity    Alcohol use: Yes     Alcohol/week: 3.0 standard  drinks of alcohol     Types: 3 Drinks containing 0.5 oz of alcohol per week    Drug use: Not Currently    Sexual activity: Yes     Partners: Male     Birth control/protection: Partner-Vasectomy      Family History   Problem Relation Age of Onset    Hypertension Mother     Lupus Mother     Thyroid disease Mother     Asthma Mother     Depression Mother     Heart disease Mother     Hypertension Father     Heart attack Father     Hearing loss Father     Heart disease Father     No Known Problems Sister     No Known Problems Brother     Cancer Brother           Objective:     Physical Exam  Vitals and nursing note reviewed.   Constitutional:       Appearance: Normal appearance. She is normal weight.   HENT:      Head: Normocephalic and atraumatic.      Nose: Nose normal.      Mouth/Throat:      Mouth: Mucous membranes are moist.      Pharynx: Oropharynx is clear.   Eyes:      Extraocular Movements: Extraocular movements intact.   Cardiovascular:      Rate and Rhythm: Normal rate and regular rhythm.      Pulses: Normal pulses.      Heart sounds: Normal heart sounds.   Pulmonary:      Effort: Pulmonary effort is normal.      Breath sounds: Normal breath sounds.   Musculoskeletal:         General: Normal range of motion.      Cervical back: Normal range of motion.   Skin:     General: Skin is warm and dry.   Neurological:      General: No focal deficit present.      Mental Status: She is alert and oriented to person, place, and time. Mental status is at baseline.   Psychiatric:         Mood and Affect: Mood normal.       Current Outpatient Medications on File Prior to Visit   Medication Sig Dispense Refill    b complex vitamins tablet Take 1 tablet by mouth once daily.      BIOTIN, BULK, MISC by Misc.(Non-Drug; Combo Route) route.      cyclobenzaprine (FLEXERIL) 10 MG tablet Take 10 mg by mouth 3 (three) times daily as needed for Muscle spasms.      ergocalciferol, vitamin D2, (VITAMIN D ORAL) Take by mouth.       multivitamin (THERAGRAN) per tablet Take 1 tablet by mouth once daily.      [DISCONTINUED] ketorolac (TORADOL) 10 mg tablet Take 10 mg by mouth every 6 (six) hours.       No current facility-administered medications on file prior to visit.     Health Maintenance   Topic Date Due    TETANUS VACCINE  01/08/2025 (Originally 10/13/2002)    Hepatitis C Screening  Completed    Lipid Panel  Completed      Results for orders placed or performed in visit on 02/03/23   Comprehensive Metabolic Panel   Result Value Ref Range    Sodium Level 141 136 - 145 mmol/L    Potassium Level 4.4 3.5 - 5.1 mmol/L    Chloride 107 98 - 107 mmol/L    Carbon Dioxide 26 22 - 29 mmol/L    Glucose Level 95 74 - 100 mg/dL    Blood Urea Nitrogen 8.7 7.0 - 18.7 mg/dL    Creatinine 0.81 0.55 - 1.02 mg/dL    Calcium Level Total 9.5 8.4 - 10.2 mg/dL    Protein Total 7.2 6.4 - 8.3 gm/dL    Albumin Level 4.3 3.5 - 5.0 g/dL    Globulin 2.9 2.4 - 3.5 gm/dL    Albumin/Globulin Ratio 1.5 1.1 - 2.0 ratio    Bilirubin Total 0.9 <=1.5 mg/dL    Alkaline Phosphatase 56 40 - 150 unit/L    Alanine Aminotransferase 15 0 - 55 unit/L    Aspartate Aminotransferase 19 5 - 34 unit/L    eGFR >60 mls/min/1.73/m2   Lipid Panel   Result Value Ref Range    Cholesterol Total 134 <=200 mg/dL    HDL Cholesterol 59 35 - 60 mg/dL    Triglyceride 61 37 - 140 mg/dL    Cholesterol/HDL Ratio 2 0 - 5    Very Low Density Lipoprotein 12     LDL Cholesterol 63.00 50.00 - 140.00 mg/dL   TSH   Result Value Ref Range    TSH 1.272 0.350 - 4.940 uIU/mL   Hepatitis C Antibody   Result Value Ref Range    Hep C Ab Interp Nonreactive Nonreactive   HIV 1/2 Ag/Ab (4th Gen)   Result Value Ref Range    HIV Nonreactive Nonreactive   Vitamin D   Result Value Ref Range    Vit D 25 OH 83.0 (H) 30.0 - 80.0 ng/mL   CBC with Differential   Result Value Ref Range    WBC 5.3 4.5 - 11.5 x10(3)/mcL    RBC 4.15 (L) 4.20 - 5.40 x10(6)/mcL    Hgb 13.8 12.0 - 16.0 gm/dL    Hct 40.4 37.0 - 47.0 %    MCV 97.3 (H) 80.0  - 94.0 fL    MCH 33.3 pg    MCHC 34.2 33.0 - 36.0 mg/dL    RDW 12.2 11.5 - 17.0 %    Platelet 259 130 - 400 x10(3)/mcL    MPV 10.1 7.4 - 10.4 fL    Neut % 54.5 %    Lymph % 37.6 %    Mono % 6.2 %    Eos % 0.6 %    Basophil % 0.9 %    Lymph # 1.99 0.6 - 4.6 x10(3)/mcL    Neut # 2.88 2.1 - 9.2 x10(3)/mcL    Mono # 0.33 0.1 - 1.3 x10(3)/mcL    Eos # 0.03 0 - 0.9 x10(3)/mcL    Baso # 0.05 0 - 0.2 x10(3)/mcL    IG# 0.01 0 - 0.04 x10(3)/mcL    IG% 0.2 %    NRBC% 0.0 %          Assessment & Plan:     Active Problem List with Overview Notes    Diagnosis Date Noted    Wellness examination 01/05/2023    Influenza vaccination declined by patient 01/05/2023    Hypovitaminosis D 01/05/2023    Fibromyalgia     Anxiety     Migraine     Insomnia        1. Wellness examination  Assessment & Plan:  Fasting labs ordered. Will call with results when available.   Pap 1/2021. Has appt with gyn 2/2024  Declines immunizations today    Orders:  -     CBC Auto Differential; Future; Expected date: 01/08/2025  -     Comprehensive Metabolic Panel; Future; Expected date: 01/08/2025  -     Lipid Panel; Future; Expected date: 01/08/2025  -     TSH; Future; Expected date: 01/08/2025  -     Vitamin D; Future; Expected date: 01/08/2025  -     CBC Auto Differential; Future; Expected date: 01/08/2024  -     Comprehensive Metabolic Panel; Future; Expected date: 01/08/2024  -     Lipid Panel; Future; Expected date: 01/08/2024  -     TSH; Future; Expected date: 01/08/2024  -     Urinalysis; Future; Expected date: 01/08/2024  -     Vitamin D; Future; Expected date: 01/08/2024    2. Hypovitaminosis D  Assessment & Plan:  Level 2/2023 elevated. Labs pending. Will call with results when available.     Orders:  -     CBC Auto Differential; Future; Expected date: 01/08/2025  -     Comprehensive Metabolic Panel; Future; Expected date: 01/08/2025  -     Lipid Panel; Future; Expected date: 01/08/2025  -     TSH; Future; Expected date: 01/08/2025  -     Vitamin D;  Future; Expected date: 01/08/2025  -     CBC Auto Differential; Future; Expected date: 01/08/2024  -     Comprehensive Metabolic Panel; Future; Expected date: 01/08/2024  -     Lipid Panel; Future; Expected date: 01/08/2024  -     TSH; Future; Expected date: 01/08/2024  -     Urinalysis; Future; Expected date: 01/08/2024  -     Vitamin D; Future; Expected date: 01/08/2024    3. Anxiety  Assessment & Plan:  Doing well on medical marijuana from other provider    Orders:  -     CBC Auto Differential; Future; Expected date: 01/08/2025  -     Comprehensive Metabolic Panel; Future; Expected date: 01/08/2025  -     Lipid Panel; Future; Expected date: 01/08/2025  -     TSH; Future; Expected date: 01/08/2025  -     Vitamin D; Future; Expected date: 01/08/2025  -     CBC Auto Differential; Future; Expected date: 01/08/2024  -     Comprehensive Metabolic Panel; Future; Expected date: 01/08/2024  -     Lipid Panel; Future; Expected date: 01/08/2024  -     TSH; Future; Expected date: 01/08/2024  -     Urinalysis; Future; Expected date: 01/08/2024  -     Vitamin D; Future; Expected date: 01/08/2024    4. Other migraine without status migrainosus, not intractable  Assessment & Plan:  Not on maxalt. Taking flexeril and toradol as needed. Likely cervicogenic.  Doing well. Asking for refill of toradol. Uses sparingly. Refill sent in today.     Orders:  -     ketorolac (TORADOL) 10 mg tablet; Take 1 tablet (10 mg total) by mouth every 6 (six) hours.  Dispense: 30 tablet; Refill: 0         Follow up in about 1 year (around 1/8/2025) for Wellness with Labs.

## 2024-01-08 ENCOUNTER — OFFICE VISIT (OUTPATIENT)
Dept: FAMILY MEDICINE | Facility: CLINIC | Age: 39
End: 2024-01-08
Payer: COMMERCIAL

## 2024-01-08 ENCOUNTER — LAB VISIT (OUTPATIENT)
Dept: LAB | Facility: HOSPITAL | Age: 39
End: 2024-01-08
Attending: FAMILY MEDICINE
Payer: COMMERCIAL

## 2024-01-08 VITALS
HEIGHT: 60 IN | RESPIRATION RATE: 16 BRPM | HEART RATE: 83 BPM | TEMPERATURE: 99 F | BODY MASS INDEX: 23.63 KG/M2 | SYSTOLIC BLOOD PRESSURE: 128 MMHG | OXYGEN SATURATION: 98 % | WEIGHT: 120.38 LBS | DIASTOLIC BLOOD PRESSURE: 78 MMHG

## 2024-01-08 DIAGNOSIS — E55.9 HYPOVITAMINOSIS D: ICD-10-CM

## 2024-01-08 DIAGNOSIS — F41.9 ANXIETY: ICD-10-CM

## 2024-01-08 DIAGNOSIS — G43.809 OTHER MIGRAINE WITHOUT STATUS MIGRAINOSUS, NOT INTRACTABLE: ICD-10-CM

## 2024-01-08 DIAGNOSIS — Z00.00 WELLNESS EXAMINATION: Primary | ICD-10-CM

## 2024-01-08 DIAGNOSIS — Z00.00 WELLNESS EXAMINATION: ICD-10-CM

## 2024-01-08 LAB
ALBUMIN SERPL-MCNC: 4 G/DL (ref 3.5–5)
ALBUMIN/GLOB SERPL: 1.3 RATIO (ref 1.1–2)
ALP SERPL-CCNC: 192 UNIT/L (ref 40–150)
ALT SERPL-CCNC: 59 UNIT/L (ref 0–55)
APPEARANCE UR: CLEAR
AST SERPL-CCNC: 32 UNIT/L (ref 5–34)
BACTERIA #/AREA URNS AUTO: ABNORMAL /HPF
BASOPHILS # BLD AUTO: 0.03 X10(3)/MCL
BASOPHILS NFR BLD AUTO: 0.6 %
BILIRUB SERPL-MCNC: 0.5 MG/DL
BILIRUB UR QL STRIP.AUTO: NEGATIVE
BUN SERPL-MCNC: 9.8 MG/DL (ref 7–18.7)
CALCIUM SERPL-MCNC: 8.8 MG/DL (ref 8.4–10.2)
CHLORIDE SERPL-SCNC: 111 MMOL/L (ref 98–107)
CHOLEST SERPL-MCNC: 115 MG/DL
CHOLEST/HDLC SERPL: 2 {RATIO} (ref 0–5)
CO2 SERPL-SCNC: 23 MMOL/L (ref 22–29)
COLOR UR AUTO: YELLOW
CREAT SERPL-MCNC: 0.8 MG/DL (ref 0.55–1.02)
DEPRECATED CALCIDIOL+CALCIFEROL SERPL-MC: 84.6 NG/ML (ref 30–80)
EOSINOPHIL # BLD AUTO: 0.02 X10(3)/MCL (ref 0–0.9)
EOSINOPHIL NFR BLD AUTO: 0.4 %
ERYTHROCYTE [DISTWIDTH] IN BLOOD BY AUTOMATED COUNT: 12.2 % (ref 11.5–17)
GFR SERPLBLD CREATININE-BSD FMLA CKD-EPI: >60 MLS/MIN/1.73/M2
GLOBULIN SER-MCNC: 3 GM/DL (ref 2.4–3.5)
GLUCOSE SERPL-MCNC: 107 MG/DL (ref 74–100)
GLUCOSE UR QL STRIP.AUTO: NEGATIVE
HCT VFR BLD AUTO: 38.3 % (ref 37–47)
HDLC SERPL-MCNC: 48 MG/DL (ref 35–60)
HGB BLD-MCNC: 13.1 G/DL (ref 12–16)
IMM GRANULOCYTES # BLD AUTO: 0.01 X10(3)/MCL (ref 0–0.04)
IMM GRANULOCYTES NFR BLD AUTO: 0.2 %
KETONES UR QL STRIP.AUTO: NEGATIVE
LDLC SERPL CALC-MCNC: 48 MG/DL (ref 50–140)
LEUKOCYTE ESTERASE UR QL STRIP.AUTO: NEGATIVE
LYMPHOCYTES # BLD AUTO: 1.98 X10(3)/MCL (ref 0.6–4.6)
LYMPHOCYTES NFR BLD AUTO: 39.4 %
MCH RBC QN AUTO: 33.2 PG (ref 27–31)
MCHC RBC AUTO-ENTMCNC: 34.2 G/DL (ref 33–36)
MCV RBC AUTO: 97 FL (ref 80–94)
MONOCYTES # BLD AUTO: 0.38 X10(3)/MCL (ref 0.1–1.3)
MONOCYTES NFR BLD AUTO: 7.6 %
MUCOUS THREADS URNS QL MICRO: ABNORMAL /LPF
NEUTROPHILS # BLD AUTO: 2.61 X10(3)/MCL (ref 2.1–9.2)
NEUTROPHILS NFR BLD AUTO: 51.8 %
NITRITE UR QL STRIP.AUTO: NEGATIVE
NRBC BLD AUTO-RTO: 0 %
PH UR STRIP.AUTO: 6.5 [PH]
PLATELET # BLD AUTO: 293 X10(3)/MCL (ref 130–400)
PMV BLD AUTO: 9.6 FL (ref 7.4–10.4)
POTASSIUM SERPL-SCNC: 3.6 MMOL/L (ref 3.5–5.1)
PROT SERPL-MCNC: 7 GM/DL (ref 6.4–8.3)
PROT UR QL STRIP.AUTO: NEGATIVE
RBC # BLD AUTO: 3.95 X10(6)/MCL (ref 4.2–5.4)
RBC #/AREA URNS AUTO: ABNORMAL /HPF
RBC UR QL AUTO: ABNORMAL
SODIUM SERPL-SCNC: 141 MMOL/L (ref 136–145)
SP GR UR STRIP.AUTO: >=1.03 (ref 1–1.03)
SQUAMOUS #/AREA URNS AUTO: ABNORMAL /HPF
TRIGL SERPL-MCNC: 93 MG/DL (ref 37–140)
TSH SERPL-ACNC: 1.08 UIU/ML (ref 0.35–4.94)
UROBILINOGEN UR STRIP-ACNC: 0.2
VLDLC SERPL CALC-MCNC: 19 MG/DL
WBC # SPEC AUTO: 5.03 X10(3)/MCL (ref 4.5–11.5)
WBC #/AREA URNS AUTO: ABNORMAL /HPF

## 2024-01-08 PROCEDURE — 3078F DIAST BP <80 MM HG: CPT | Mod: CPTII,,, | Performed by: FAMILY MEDICINE

## 2024-01-08 PROCEDURE — 3008F BODY MASS INDEX DOCD: CPT | Mod: CPTII,,, | Performed by: FAMILY MEDICINE

## 2024-01-08 PROCEDURE — 85025 COMPLETE CBC W/AUTO DIFF WBC: CPT

## 2024-01-08 PROCEDURE — 84443 ASSAY THYROID STIM HORMONE: CPT

## 2024-01-08 PROCEDURE — 80053 COMPREHEN METABOLIC PANEL: CPT

## 2024-01-08 PROCEDURE — 80061 LIPID PANEL: CPT

## 2024-01-08 PROCEDURE — 3074F SYST BP LT 130 MM HG: CPT | Mod: CPTII,,, | Performed by: FAMILY MEDICINE

## 2024-01-08 PROCEDURE — 82306 VITAMIN D 25 HYDROXY: CPT

## 2024-01-08 PROCEDURE — 1159F MED LIST DOCD IN RCRD: CPT | Mod: CPTII,,, | Performed by: FAMILY MEDICINE

## 2024-01-08 PROCEDURE — 99395 PREV VISIT EST AGE 18-39: CPT | Mod: ,,, | Performed by: FAMILY MEDICINE

## 2024-01-08 PROCEDURE — 36415 COLL VENOUS BLD VENIPUNCTURE: CPT

## 2024-01-08 RX ORDER — KETOROLAC TROMETHAMINE 10 MG/1
10 TABLET, FILM COATED ORAL EVERY 6 HOURS
COMMUNITY
End: 2024-01-08 | Stop reason: SDUPTHER

## 2024-01-08 RX ORDER — KETOROLAC TROMETHAMINE 10 MG/1
10 TABLET, FILM COATED ORAL EVERY 6 HOURS
Qty: 30 TABLET | Refills: 0 | Status: SHIPPED | OUTPATIENT
Start: 2024-01-08

## 2024-01-08 NOTE — ASSESSMENT & PLAN NOTE
Not on maxalt. Taking flexeril and toradol as needed. Likely cervicogenic.  Doing well. Asking for refill of toradol. Uses sparingly. Refill sent in today.

## 2024-01-09 NOTE — PROGRESS NOTES
Please inform patient of results.    1. Ua shows possible infection but also has skin cells so likely contaminant. Any uti symptoms? If yes, need to call lab to add culture.       Other labwork within acceptable ranges.

## 2024-01-09 NOTE — PROGRESS NOTES
Please inform patient of results.    1. Vit d is still elevated.  Hold x 2 months and then can resume at 1/2 original dose.   2. Her alt is elevated slightly and alk phos.  Avoid etoh, tyelnol and herbal teas.  Encourage fluids. Monitor.  Sometimes alk phos can be elevated with gallstones.      Other labwork within acceptable ranges.

## 2024-01-19 LAB
C TRACH DNA SPEC QL NAA+PROBE: NEGATIVE
HUMAN PAPILLOMAVIRUS (HPV): NORMAL
PAP RECOMMENDATION EXT: NORMAL
PAP SMEAR: NORMAL

## 2024-01-23 DIAGNOSIS — Z12.31 OTHER SCREENING MAMMOGRAM: Primary | ICD-10-CM

## 2024-02-21 ENCOUNTER — PATIENT MESSAGE (OUTPATIENT)
Dept: ADMINISTRATIVE | Facility: HOSPITAL | Age: 39
End: 2024-02-21
Payer: COMMERCIAL

## 2024-02-22 ENCOUNTER — PATIENT OUTREACH (OUTPATIENT)
Dept: ADMINISTRATIVE | Facility: HOSPITAL | Age: 39
End: 2024-02-22
Payer: COMMERCIAL

## 2024-02-22 NOTE — PROGRESS NOTES
Population Health Chart Review & Patient Outreach Details      Further Action Needed If Patient Returns Outreach:      Hyper link records      Updates Requested / Reviewed:     []  Care Everywhere    []     []  External Sources ([facility], [facility], [facility], etc.)    [] [facility]   [] [facility]   [] Other:    []  Care Team Updated   []  Removed  or Duplicate Orders   []  Immunization Reconciliation Completed / Queried    [] [place]   [] [place]   [] [place]   [] [place]      Health Maintenance Topics Addressed and Outreach Outcomes / Actions Taken:             Breast Cancer Screening []  Mammogram Order Placed    []  Mammogram Screening Scheduled    []  External Records Requested & Care Team Updated if Applicable    []  External Records Uploaded & Care Team Updated if Applicable    []  Pt Declined Scheduling Mammogram    []  Pt Will Schedule with External Provider / Order Routed & Care Team Updated if Applicable              Cervical Cancer Screening []  Pap Smear Scheduled in Primary Care or OBGYN    [x]  External Records Requested & Care Team Updated if Applicable       []  External Records Uploaded, Care Team Updated, & History Updated if Applicable    []  Patient Declined Scheduling Pap Smear    []  Patient Will Schedule with External Provider & Care Team Updated if Applicable                  Colorectal Cancer Screening []  Colonoscopy Case Request / Referral / Home Test Order Placed    []  External Records Requested & Care Team Updated if Applicable    []  External Records Uploaded, Care Team Updated, & History Updated if Applicable    []  Patient Declined Completing Colon Cancer Screening    []  Patient Will Schedule with External Provider & Care Team Updated if Applicable    []  Fit Kit Mailed (add the SmartPhrase under additional notes)    []  Reminded Patient to Complete Home Test                Diabetic Eye Exam []  Eye Exam Screening Order Placed    []  Eye Camera Scheduled or  Optometry/Ophthalmology Referral Placed    []  External Records Requested & Care Team Updated if Applicable    []  External Records Uploaded, Care Team Updated, & History Updated if Applicable    []  Patient Declined Scheduling Eye Exam    []  Patient Will Schedule with External Provider & Care Team Updated if Applicable             Blood Pressure Control []  Primary Care Follow Up Visit Scheduled     []  Remote Blood Pressure Reading Captured    []  Patient Declined Remote Reading or Scheduling Appt - Escalated to PCP    []  Patient Will Call Back or Send Portal Message with Reading                 HbA1c & Other Labs []  Overdue Lab(s) Ordered    []  Overdue Lab(s) Scheduled    []  External Records Uploaded & Care Team Updated if Applicable    []  Primary Care Follow Up Visit Scheduled     []  Reminded Patient to Complete A1c Home Test    []  Patient Declined Scheduling Labs or Will Call Back to Schedule    []  Patient Will Schedule with External Provider / Order Routed, & Care Team Updated if Applicable           Primary Care Appointment []  Primary Care Appt Scheduled    []  Patient Declined Scheduling or Will Call Back to Schedule    []  Pt Established with External Provider, Updated Care Team, & Informed Pt to Notify Payor if Applicable           Medication Adherence /    Statin Use []  Primary Care Appointment Scheduled    []  Patient Reminded to  Prescription    []  Patient Declined, Provider Notified if Needed    []  Sent Provider Message to Review to Evaluate Pt for Statin, Add Exclusion Dx Codes, Document   Exclusion in Problem List, Change Statin Intensity Level to Moderate or High Intensity if Applicable                Osteoporosis Screening []  Dexa Order Placed    []  Dexa Appointment Scheduled    []  External Records Requested & Care Team Updated    []  External Records Uploaded, Care Team Updated, & History Updated if Applicable    []  Patient Declined Scheduling Dexa or Will Call Back to  Schedule    []  Patient Will Schedule with External Provider / Order Routed & Care Team Updated if Applicable       Additional Notes:    Records request sent to Dr. Ewing's office for pap smear.

## 2024-02-22 NOTE — LETTER
AUTHORIZATION FOR RELEASE OF   CONFIDENTIAL INFORMATION    Dear Dr. Ewing, Fax [phone]    We are seeing Ashwini Trujillo, date of birth 1985, in the clinic at [facility]. Keily Linn MD is the patient's PCP. Ashwini Trujillo has an outstanding lab/procedure at the time we reviewed her chart. In order to help keep her health information updated, she has authorized us to request the following medical record(s):        (  )  MAMMOGRAM                                      (  )  COLONOSCOPY      ( X )  PAP SMEAR                                          (  )  OUTSIDE LAB RESULTS     (  )  DEXA SCAN                                          (  )  EYE EXAM            (  )  FOOT EXAM                                          (  )  ENTIRE RECORD     (  )  OUTSIDE IMMUNIZATIONS                 (  )  _______________         Please fax records to [facility], Taylor, Michelle M, MD, [phone] or [phone].       If you have any questions you can contact Annie Coy at [phone].           Patient Name: Ashwini Trujillo  : 1985  Patient Phone #: [phone]

## 2024-02-28 NOTE — PROGRESS NOTES
The following record(s)  below were uploaded for Health Maintenance .    PAP SMEAR   1/19/24  HPV SCREENING    1/19/24  CHLAMYDIA SCREENING   1/19/24

## 2024-04-08 PROBLEM — Z00.00 WELLNESS EXAMINATION: Status: RESOLVED | Noted: 2023-01-05 | Resolved: 2024-04-08

## 2024-10-18 ENCOUNTER — HOSPITAL ENCOUNTER (OUTPATIENT)
Dept: RADIOLOGY | Facility: HOSPITAL | Age: 39
Discharge: HOME | End: 2024-10-18
Attending: OBSTETRICS & GYNECOLOGY
Payer: COMMERCIAL

## 2024-10-18 DIAGNOSIS — Z12.31 OTHER SCREENING MAMMOGRAM: ICD-10-CM

## 2024-10-18 PROCEDURE — 77063 BREAST TOMOSYNTHESIS BI: CPT | Mod: 26,,, | Performed by: RADIOLOGY

## 2024-10-18 PROCEDURE — 77067 SCR MAMMO BI INCL CAD: CPT | Mod: TC

## 2024-10-18 PROCEDURE — 77067 SCR MAMMO BI INCL CAD: CPT | Mod: 26,,, | Performed by: RADIOLOGY

## 2024-12-12 ENCOUNTER — E-VISIT (OUTPATIENT)
Dept: FAMILY MEDICINE | Facility: CLINIC | Age: 39
End: 2024-12-12
Payer: COMMERCIAL

## 2024-12-12 DIAGNOSIS — R30.0 DYSURIA: Primary | ICD-10-CM

## 2024-12-12 RX ORDER — SULFAMETHOXAZOLE AND TRIMETHOPRIM 800; 160 MG/1; MG/1
1 TABLET ORAL 2 TIMES DAILY
Qty: 14 TABLET | Refills: 0 | Status: SHIPPED | OUTPATIENT
Start: 2024-12-12 | End: 2024-12-19

## 2024-12-12 NOTE — PROGRESS NOTES
Patient ID: Ashwini Trujillo is a 39 y.o. female.    Chief Complaint: General Illness (Entered automatically based on patient selection in [employer].)    The patient initiated a request through [employer] on 12/12/2024 for evaluation and management with a chief complaint of General Illness (Entered automatically based on patient selection in [place].)     I evaluated the questionnaire submission on 12/12/24.    [facility]    12/12/2024  3:08 PM CST - Filed by Patient   What do you need help with? Urinary Symptoms   Do you agree to participate in an E-Visit? Yes   If you have any of the following symptoms, please present to your local emergency room or call 911:  I acknowledge   Do you have any of the following pregnancy-related conditions? None   What is the main issue you would like addressed today? Urinary Tract Infection   What symptoms do you currently have? Pain while passing urine   When did your symptoms first appear? 12/1/2024   List what you have done or taken to help your symptoms. Taken over the counter meds for UTIs and drinking lots of water   Please indicate whether you have had the following symptoms during the past 24 hours     Urgent urination (a sudden and uncontrollable urge to urinate) Mild   Frequent urination of small amounts of urine (going to the toilet very often) Moderate   Burning pain when urinating Moderate   Incomplete bladder emptying (still feel like you need to urinate again after urination) Moderate   Pain not associated with urination in the lower abdomen below the belly button) Moderate   What does your urine look like? Cloudy   Blood seen in the urine None   Flank pain (pain in one or both sides of the lower back) Mild   Abnormal Vaginal Discharge (abnormal amount, color and/or odor) None   Discharge from the urethra (urinary opening) without urination None   High body temperature/fever? Yes, mild-99.6 F-100.2 F   Please rate how much discomfort you have  experience because of the symptoms in the past 24 hours: Mild   Please indicate how the symptoms have interfered with your every day activities/work in the past 24 hours: Mild   Please indicate how these symptoms have interfered with your social activities (visiting people, meeting with friends, etc.) in the past 24 hours? Mild   Are you a diabetic? No   Please indicate whether you have the following at the time of completion of this questionnaire: None of the above   Provide any additional information you feel is important.    Please attach any relevant images or files (if you have performed a home test for UTI, please submit a photo of results)    Are you able to take your vital signs? No         Encounter Diagnosis   Name Primary?    Dysuria Yes        Orders Placed This Encounter   Procedures    Urine culture     Standing Status:   Future     Standing Expiration Date:   2/12/2026     Order Specific Question:   Send normal result to authorizing provider's In Basket if patient is active on MyChart:     Answer:   Yes    Urinalysis     Standing Status:   Future     Standing Expiration Date:   12/12/2025      Medications Ordered This Encounter   Medications    sulfamethoxazole-trimethoprim 800-160mg (BACTRIM DS) 800-160 mg Tab     Sig: Take 1 tablet by mouth 2 (two) times daily. for 7 days     Dispense:  14 tablet     Refill:  0        A/P:  Dysuria: Will collect ua and urine culture. Advise to complete before starting antibiotics if possible. Encourage fluids. Ok to take azo otc. Will call with results when available.  Monitor symptoms. Contact clinic for concerns.     Follow up for as scheduled or sooner prn.      E-Visit Time Tracking:

## 2024-12-12 NOTE — ASSESSMENT & PLAN NOTE
Will collect ua and urine culture. Advise to complete before starting antibiotics if possible. Encourage fluids. Ok to take azo otc. Will call with results when available.  Monitor symptoms. Contact clinic for concerns.

## 2024-12-13 ENCOUNTER — APPOINTMENT (OUTPATIENT)
Dept: LAB | Facility: HOSPITAL | Age: 39
End: 2024-12-13
Attending: FAMILY MEDICINE
Payer: COMMERCIAL

## 2024-12-16 ENCOUNTER — TELEPHONE (OUTPATIENT)
Dept: FAMILY MEDICINE | Facility: CLINIC | Age: 39
End: 2024-12-16
Payer: COMMERCIAL

## 2024-12-16 NOTE — TELEPHONE ENCOUNTER
----- Message from Yolanda Munguia NP sent at 12/16/2024  9:30 AM CST -----  Please inform    Urine culture confirms UTI  She is on correct antibiotic; please take until completed  F/U if no improvement of symtpoms

## 2025-01-03 ENCOUNTER — LAB VISIT (OUTPATIENT)
Dept: LAB | Facility: HOSPITAL | Age: 40
End: 2025-01-03
Attending: FAMILY MEDICINE
Payer: COMMERCIAL

## 2025-01-03 DIAGNOSIS — Z00.00 WELLNESS EXAMINATION: ICD-10-CM

## 2025-01-03 DIAGNOSIS — E55.9 HYPOVITAMINOSIS D: ICD-10-CM

## 2025-01-03 DIAGNOSIS — F41.9 ANXIETY: ICD-10-CM

## 2025-01-03 LAB
25(OH)D3+25(OH)D2 SERPL-MCNC: 87 NG/ML (ref 30–80)
ALBUMIN SERPL-MCNC: 4.4 G/DL (ref 3.5–5)
ALBUMIN/GLOB SERPL: 1.8 RATIO (ref 1.1–2)
ALP SERPL-CCNC: 48 UNIT/L (ref 40–150)
ALT SERPL-CCNC: 16 UNIT/L (ref 0–55)
ANION GAP SERPL CALC-SCNC: 9 MEQ/L
AST SERPL-CCNC: 17 UNIT/L (ref 5–34)
BASOPHILS # BLD AUTO: 0.04 X10(3)/MCL
BASOPHILS NFR BLD AUTO: 0.7 %
BILIRUB SERPL-MCNC: 0.6 MG/DL
BUN SERPL-MCNC: 11.2 MG/DL (ref 7–18.7)
CALCIUM SERPL-MCNC: 9.1 MG/DL (ref 8.4–10.2)
CHLORIDE SERPL-SCNC: 106 MMOL/L (ref 98–107)
CHOLEST SERPL-MCNC: 133 MG/DL
CHOLEST/HDLC SERPL: 2 {RATIO} (ref 0–5)
CO2 SERPL-SCNC: 25 MMOL/L (ref 22–29)
CREAT SERPL-MCNC: 0.76 MG/DL (ref 0.55–1.02)
CREAT/UREA NIT SERPL: 15
EOSINOPHIL # BLD AUTO: 0.02 X10(3)/MCL (ref 0–0.9)
EOSINOPHIL NFR BLD AUTO: 0.3 %
ERYTHROCYTE [DISTWIDTH] IN BLOOD BY AUTOMATED COUNT: 12.5 % (ref 11.5–17)
GFR SERPLBLD CREATININE-BSD FMLA CKD-EPI: >60 ML/MIN/1.73/M2
GLOBULIN SER-MCNC: 2.5 GM/DL (ref 2.4–3.5)
GLUCOSE SERPL-MCNC: 110 MG/DL (ref 74–100)
HCT VFR BLD AUTO: 36.3 % (ref 37–47)
HDLC SERPL-MCNC: 55 MG/DL (ref 35–60)
HGB BLD-MCNC: 12.4 G/DL (ref 12–16)
IMM GRANULOCYTES # BLD AUTO: 0.02 X10(3)/MCL (ref 0–0.04)
IMM GRANULOCYTES NFR BLD AUTO: 0.3 %
LDLC SERPL CALC-MCNC: 67 MG/DL (ref 50–140)
LYMPHOCYTES # BLD AUTO: 2.62 X10(3)/MCL (ref 0.6–4.6)
LYMPHOCYTES NFR BLD AUTO: 44.4 %
MCH RBC QN AUTO: 33.6 PG (ref 27–31)
MCHC RBC AUTO-ENTMCNC: 34.2 G/DL (ref 33–36)
MCV RBC AUTO: 98.4 FL (ref 80–94)
MONOCYTES # BLD AUTO: 0.36 X10(3)/MCL (ref 0.1–1.3)
MONOCYTES NFR BLD AUTO: 6.1 %
NEUTROPHILS # BLD AUTO: 2.84 X10(3)/MCL (ref 2.1–9.2)
NEUTROPHILS NFR BLD AUTO: 48.2 %
NRBC BLD AUTO-RTO: 0 %
PLATELET # BLD AUTO: 258 X10(3)/MCL (ref 130–400)
PMV BLD AUTO: 9.6 FL (ref 7.4–10.4)
POTASSIUM SERPL-SCNC: 4 MMOL/L (ref 3.5–5.1)
PROT SERPL-MCNC: 6.9 GM/DL (ref 6.4–8.3)
RBC # BLD AUTO: 3.69 X10(6)/MCL (ref 4.2–5.4)
SODIUM SERPL-SCNC: 140 MMOL/L (ref 136–145)
TRIGL SERPL-MCNC: 55 MG/DL (ref 37–140)
TSH SERPL-ACNC: 0.89 UIU/ML (ref 0.35–4.94)
VLDLC SERPL CALC-MCNC: 11 MG/DL
WBC # BLD AUTO: 5.9 X10(3)/MCL (ref 4.5–11.5)

## 2025-01-03 PROCEDURE — 85025 COMPLETE CBC W/AUTO DIFF WBC: CPT

## 2025-01-03 PROCEDURE — 36415 COLL VENOUS BLD VENIPUNCTURE: CPT

## 2025-01-03 PROCEDURE — 80053 COMPREHEN METABOLIC PANEL: CPT

## 2025-01-03 PROCEDURE — 84443 ASSAY THYROID STIM HORMONE: CPT

## 2025-01-03 PROCEDURE — 80061 LIPID PANEL: CPT

## 2025-01-03 PROCEDURE — 82306 VITAMIN D 25 HYDROXY: CPT

## 2025-01-06 ENCOUNTER — TELEPHONE (OUTPATIENT)
Dept: FAMILY MEDICINE | Facility: CLINIC | Age: 40
End: 2025-01-06
Payer: COMMERCIAL

## 2025-01-06 NOTE — TELEPHONE ENCOUNTER
----- Message from Keily Linn MD sent at 1/6/2025  7:47 AM CST -----  Please inform patient of results. Keep upcoming appt 1/9/25 to discuss further    1.  Vit d is elevated still.  Hold all vitamin d supplementation. For at least 2 months. Then can resume at 1/2 original dose.   2. Her mcv is elevated.  Consider checking her b12/folate levels if we have not done in past.  This can be done nonfasting.     Other labwork within acceptable ranges.

## 2025-01-09 ENCOUNTER — LAB VISIT (OUTPATIENT)
Dept: LAB | Facility: HOSPITAL | Age: 40
End: 2025-01-09
Attending: FAMILY MEDICINE
Payer: COMMERCIAL

## 2025-01-09 ENCOUNTER — OFFICE VISIT (OUTPATIENT)
Dept: FAMILY MEDICINE | Facility: CLINIC | Age: 40
End: 2025-01-09
Payer: COMMERCIAL

## 2025-01-09 VITALS
DIASTOLIC BLOOD PRESSURE: 88 MMHG | HEART RATE: 96 BPM | SYSTOLIC BLOOD PRESSURE: 128 MMHG | WEIGHT: 105.88 LBS | HEIGHT: 60 IN | TEMPERATURE: 99 F | OXYGEN SATURATION: 98 % | RESPIRATION RATE: 18 BRPM | BODY MASS INDEX: 20.79 KG/M2

## 2025-01-09 DIAGNOSIS — Z12.4 PAP SMEAR FOR CERVICAL CANCER SCREENING: ICD-10-CM

## 2025-01-09 DIAGNOSIS — Z00.00 WELLNESS EXAMINATION: Primary | ICD-10-CM

## 2025-01-09 DIAGNOSIS — Z12.31 BREAST CANCER SCREENING BY MAMMOGRAM: ICD-10-CM

## 2025-01-09 DIAGNOSIS — D64.9 ANEMIA, UNSPECIFIED TYPE: ICD-10-CM

## 2025-01-09 DIAGNOSIS — R55 NEAR SYNCOPE: ICD-10-CM

## 2025-01-09 DIAGNOSIS — Z00.00 WELLNESS EXAMINATION: ICD-10-CM

## 2025-01-09 DIAGNOSIS — G43.809 OTHER MIGRAINE WITHOUT STATUS MIGRAINOSUS, NOT INTRACTABLE: ICD-10-CM

## 2025-01-09 PROBLEM — E55.9 HYPOVITAMINOSIS D: Status: RESOLVED | Noted: 2023-01-05 | Resolved: 2025-01-09

## 2025-01-09 PROBLEM — R30.0 DYSURIA: Status: RESOLVED | Noted: 2024-12-12 | Resolved: 2025-01-09

## 2025-01-09 LAB
FERRITIN SERPL-MCNC: 204.6 NG/ML (ref 4.63–204)
FOLATE SERPL-MCNC: 17.8 NG/ML (ref 7–31.4)
IRON SATN MFR SERPL: 30 % (ref 20–50)
IRON SERPL-MCNC: 73 UG/DL (ref 50–170)
TIBC SERPL-MCNC: 171 UG/DL (ref 70–310)
TIBC SERPL-MCNC: 244 UG/DL (ref 250–450)
TRANSFERRIN SERPL-MCNC: 218 MG/DL (ref 180–382)
VIT B12 SERPL-MCNC: 701 PG/ML (ref 213–816)

## 2025-01-09 PROCEDURE — 82746 ASSAY OF FOLIC ACID SERUM: CPT

## 2025-01-09 PROCEDURE — 1160F RVW MEDS BY RX/DR IN RCRD: CPT | Mod: CPTII,,, | Performed by: FAMILY MEDICINE

## 2025-01-09 PROCEDURE — 82607 VITAMIN B-12: CPT

## 2025-01-09 PROCEDURE — 36415 COLL VENOUS BLD VENIPUNCTURE: CPT

## 2025-01-09 PROCEDURE — 3074F SYST BP LT 130 MM HG: CPT | Mod: CPTII,,, | Performed by: FAMILY MEDICINE

## 2025-01-09 PROCEDURE — 3079F DIAST BP 80-89 MM HG: CPT | Mod: CPTII,,, | Performed by: FAMILY MEDICINE

## 2025-01-09 PROCEDURE — 83540 ASSAY OF IRON: CPT

## 2025-01-09 PROCEDURE — 3008F BODY MASS INDEX DOCD: CPT | Mod: CPTII,,, | Performed by: FAMILY MEDICINE

## 2025-01-09 PROCEDURE — 82728 ASSAY OF FERRITIN: CPT

## 2025-01-09 PROCEDURE — 99396 PREV VISIT EST AGE 40-64: CPT | Mod: ,,, | Performed by: FAMILY MEDICINE

## 2025-01-09 PROCEDURE — 1159F MED LIST DOCD IN RCRD: CPT | Mod: CPTII,,, | Performed by: FAMILY MEDICINE

## 2025-01-09 RX ORDER — KETOROLAC TROMETHAMINE 10 MG/1
10 TABLET, FILM COATED ORAL EVERY 6 HOURS
Qty: 30 TABLET | Refills: 0 | Status: SHIPPED | OUTPATIENT
Start: 2025-01-09

## 2025-01-09 RX ORDER — CYCLOBENZAPRINE HCL 10 MG
10 TABLET ORAL 3 TIMES DAILY PRN
Qty: 30 TABLET | Refills: 1 | Status: SHIPPED | OUTPATIENT
Start: 2025-01-09

## 2025-01-09 NOTE — ASSESSMENT & PLAN NOTE
Taking complex. Anemia is worse from previous. Finished cycle at end of December. Will check labs and call with results when available

## 2025-01-09 NOTE — PROGRESS NOTES
Subjective:        Patient ID: Ashwini Trujillo is a 39 y.o. female.    Chief Complaint: Annual Exam (Wellness Exam w/ Labs)      Patient presents to the clinic unaccompanied today for her wellness visit.  She did labs prior to her appt and it was reviewed with her at time of appt today    She is anemic and more so than previous. Completed cycle end of December. Lab draw 1/2025.  She takes b complex.      She has low vitamin d and was supplementing otc. Level 2/2023 was elevated so has been holding. Level 1/2025 is still elevated. Takes MVI which has vit d in it. Will hold. Eats cheese but does not drink/eat dairy much otherwise.      She has insomnia and restless legs for which she was on Ambien 10 mg. Her last fill was 3/2022.  She was also on ropinirole.   Now doing medical marijuana.  Working well.      She has migraines and fibromyalgia.  Also has cervicogenic headaches.  She has tried Toradol in the past which helps the most for this.  Flexeril also helps.  Maybe takes once month. Asking for refills.  She has taken Maxalt in the past for her migraines    She has a history of mitral valve prolapse but was then told was PFO. Has [facility] in [facility]. Was released. Has not seen in years.   She reports 2 episodes of vasovagal type.  One after her 5th fingernail bent backwards and another while sitting on the toilet.  She has been under a lot of stress at work. Has lost weight due to this.       Her last Pap smear was 1/2024 which was normal and HPV negative. Mmg 10/2024. GYN is dr. Ewing     She is allergic to penicillin.  She does not smoke.  She drinks alcohol on occasion.  She declines immunizations today        Review of Systems   Constitutional: Negative.    HENT: Negative.     Respiratory: Negative.     Cardiovascular: Negative.    Gastrointestinal: Negative.    Genitourinary: Negative.          Review of patient's allergies indicates:   Allergen Reactions    Pcn [penicillins] Rash      Vitals:     01/09/25 1510 01/09/25 1535   BP: 128/88    BP Location: Left arm    Patient Position: Sitting    Pulse: 107 96   Resp: 18    Temp: 98.7 °F (37.1 °C)    TempSrc: Temporal    SpO2: 98%    Weight: 48 kg (105 lb 14.4 oz)    Height: 5' (1.524 m)       Social History     Socioeconomic History    Marital status:    Tobacco Use    Smoking status: Never    Smokeless tobacco: Never   Substance and Sexual Activity    Alcohol use: Yes     Alcohol/week: 3.0 standard drinks of alcohol     Types: 3 Drinks containing 0.5 oz of alcohol per week    Drug use: Not Currently    Sexual activity: Yes     Partners: Male     Birth control/protection: Partner-Vasectomy     Social Drivers of Health     Financial Resource Strain: Low Risk  (1/6/2025)    Overall Financial Resource Strain (CARDIA)     Difficulty of Paying Living Expenses: Not very hard   Food Insecurity: No Food Insecurity (1/6/2025)    Hunger Vital Sign     Worried About Running Out of Food in the Last Year: Never true     Ran Out of Food in the Last Year: Never true   Physical Activity: Insufficiently Active (1/6/2025)    Exercise Vital Sign     Days of Exercise per Week: 3 days     Minutes of Exercise per Session: 20 min   Stress: Stress Concern Present (1/6/2025)    [employer] - Occupational Stress Questionnaire     Feeling of Stress : To some extent   Housing Stability: Unknown (1/6/2025)    Housing Stability Vital Sign     Unable to Pay for Housing in the Last Year: No      Family History   Problem Relation Name Age of Onset    Hypertension Mother Mei Barilleaux     Lupus Mother Mei Barilleaux     Thyroid disease Mother Mei Barilleaux     Asthma Mother Mei Barilleaux     Depression Mother Mei Barilleaux     Heart disease Mother Mei Barilleaux     Hypertension Father Bradley Barilleaux Sr.     Heart attack Father Bradley Barilleaux Sr.     Hearing loss Father Bradley Barilleaux Sr.     Heart disease Father Bradley Barilleaux  Sr.     No Known Problems Sister      No Known Problems Brother      Cancer Brother Bradley Logan           Objective:     Physical Exam  Vitals and nursing note reviewed.   Constitutional:       Appearance: Normal appearance. She is normal weight.   HENT:      Head: Normocephalic and atraumatic.      Nose: Nose normal.      Mouth/Throat:      Mouth: Mucous membranes are moist.      Pharynx: Oropharynx is clear.   Eyes:      Extraocular Movements: Extraocular movements intact.   Cardiovascular:      Rate and Rhythm: Normal rate and regular rhythm.      Pulses: Normal pulses.      Heart sounds: Normal heart sounds. No murmur heard.     No friction rub. No gallop.   Pulmonary:      Effort: Pulmonary effort is normal.      Breath sounds: Normal breath sounds.   Musculoskeletal:         General: Normal range of motion.      Cervical back: Normal range of motion.   Skin:     General: Skin is warm and dry.   Neurological:      General: No focal deficit present.      Mental Status: She is alert and oriented to person, place, and time. Mental status is at baseline.   Psychiatric:         Mood and Affect: Mood normal.       Current Outpatient Medications on File Prior to Visit   Medication Sig Dispense Refill    b complex vitamins tablet Take 1 tablet by mouth once daily.      BIOTIN, BULK, MISC by Misc.(Non-Drug; Combo Route) route.      multivitamin (THERAGRAN) per tablet Take 1 tablet by mouth once daily.      [DISCONTINUED] cyclobenzaprine (FLEXERIL) 10 MG tablet Take 10 mg by mouth 3 (three) times daily as needed for Muscle spasms.      [DISCONTINUED] ketorolac (TORADOL) 10 mg tablet Take 1 tablet (10 mg total) by mouth every 6 (six) hours. 30 tablet 0    [DISCONTINUED] ergocalciferol, vitamin D2, (VITAMIN D ORAL) Take by mouth. (Patient not taking: Reported on 1/9/2025)      [DISCONTINUED] sulfamethoxazole-trimethoprim 800-160mg (BACTRIM DS) 800-160 mg Tab Take 1 tablet by mouth 2 (two) times daily. for 7 days 14  tablet 0     No current facility-administered medications on file prior to visit.     Health Maintenance   Topic Date Due    COVID-19 Vaccine (1 - 2024-25 season) Never done    Influenza Vaccine (1) 06/30/2025 (Originally 9/1/2024)    TETANUS VACCINE  01/09/2026 (Originally 10/13/2002)    Mammogram  10/21/2025    Cervical Cancer Screening  01/19/2029    RSV Vaccine (Age 60+ and Pregnant patients) (1 - 1-dose 75+ series) 10/13/2059    Hepatitis C Screening  Completed    HIV Screening  Completed    Lipid Panel  Completed    Pneumococcal Vaccines (Age 0-49)  Aged Out      Results for orders placed or performed in visit on 01/03/25   Comprehensive Metabolic Panel    Collection Time: 01/03/25 12:37 PM   Result Value Ref Range    Sodium 140 136 - 145 mmol/L    Potassium 4.0 3.5 - 5.1 mmol/L    Chloride 106 98 - 107 mmol/L    CO2 25 22 - 29 mmol/L    Glucose 110 (H) 74 - 100 mg/dL    Blood Urea Nitrogen 11.2 7.0 - 18.7 mg/dL    Creatinine 0.76 0.55 - 1.02 mg/dL    Calcium 9.1 8.4 - 10.2 mg/dL    Protein Total 6.9 6.4 - 8.3 gm/dL    Albumin 4.4 3.5 - 5.0 g/dL    Globulin 2.5 2.4 - 3.5 gm/dL    Albumin/Globulin Ratio 1.8 1.1 - 2.0 ratio    Bilirubin Total 0.6 <=1.5 mg/dL    ALP 48 40 - 150 unit/L    ALT 16 0 - 55 unit/L    AST 17 5 - 34 unit/L    eGFR >60 mL/min/1.73/m2    Anion Gap 9.0 mEq/L    BUN/Creatinine Ratio 15    Lipid Panel    Collection Time: 01/03/25 12:37 PM   Result Value Ref Range    Cholesterol Total 133 <=200 mg/dL    HDL Cholesterol 55 35 - 60 mg/dL    Triglyceride 55 37 - 140 mg/dL    Cholesterol/HDL Ratio 2 0 - 5    Very Low Density Lipoprotein 11     LDL Cholesterol 67.00 50.00 - 140.00 mg/dL   TSH    Collection Time: 01/03/25 12:37 PM   Result Value Ref Range    TSH 0.889 0.350 - 4.940 uIU/mL   Vitamin D    Collection Time: 01/03/25 12:37 PM   Result Value Ref Range    Vitamin D 87 (H) 30 - 80 ng/mL   CBC with Differential    Collection Time: 01/03/25 12:37 PM   Result Value Ref Range    WBC 5.90 4.50 -  11.50 x10(3)/mcL    RBC 3.69 (L) 4.20 - 5.40 x10(6)/mcL    Hgb 12.4 12.0 - 16.0 g/dL    Hct 36.3 (L) 37.0 - 47.0 %    MCV 98.4 (H) 80.0 - 94.0 fL    MCH 33.6 (H) 27.0 - 31.0 pg    MCHC 34.2 33.0 - 36.0 g/dL    RDW 12.5 11.5 - 17.0 %    Platelet 258 130 - 400 x10(3)/mcL    MPV 9.6 7.4 - 10.4 fL    Neut % 48.2 %    Lymph % 44.4 %    Mono % 6.1 %    Eos % 0.3 %    Basophil % 0.7 %    Lymph # 2.62 0.6 - 4.6 x10(3)/mcL    Neut # 2.84 2.1 - 9.2 x10(3)/mcL    Mono # 0.36 0.1 - 1.3 x10(3)/mcL    Eos # 0.02 0 - 0.9 x10(3)/mcL    Baso # 0.04 <=0.2 x10(3)/mcL    Imm Gran # 0.02 0 - 0.04 x10(3)/mcL    Imm Grans % 0.3 %    NRBC% 0.0 %          Assessment & Plan:     Active Problem List with Overview Notes    Diagnosis Date Noted    Pap smear for cervical cancer screening 01/09/2025    Breast cancer screening by mammogram 01/09/2025    Anemia      [place]      Wellness examination 01/05/2023    Influenza vaccination declined by patient 01/05/2023    Fibromyalgia     Migraine     Insomnia        1. Wellness examination  Assessment & Plan:  Previously done labs reviewed with patient at time of appt today   Pap 1/2024 with gyn  Declines immunizations today    Orders:  -     Folate; Future; Expected date: 01/09/2025  -     Ferritin; Future; Expected date: 01/09/2025  -     Iron and TIBC; Future; Expected date: 01/09/2025  -     Vitamin B12; Future; Expected date: 01/09/2025    2. Other migraine without status migrainosus, not intractable  Assessment & Plan:  Taking flexeril and toradol as needed. Likely cervicogenic.  Doing well. Refills sent in as requested    Orders:  -     ketorolac (TORADOL) 10 mg tablet; Take 1 tablet (10 mg total) by mouth every 6 (six) hours. Take with food  Dispense: 30 tablet; Refill: 0  -     cyclobenzaprine (FLEXERIL) 10 MG tablet; Take 1 tablet (10 mg total) by mouth 3 (three) times daily as needed for Muscle spasms. May cause drowsiness. Do not take before work or driving  Dispense: 30 tablet; Refill:  1    3. Pap smear for cervical cancer screening  Assessment & Plan:  Pap 1/2024 per gyn      4. Breast cancer screening by mammogram  Assessment & Plan:  Mmg 10/2024      5. Anemia, unspecified type  Overview:  [place]    Assessment & Plan:  Taking complex. Anemia is worse from previous. Finished cycle at end of December. Will check labs and call with results when available    Orders:  -     Folate; Future; Expected date: 01/09/2025  -     Ferritin; Future; Expected date: 01/09/2025  -     Iron and TIBC; Future; Expected date: 01/09/2025  -     Vitamin B12; Future; Expected date: 01/09/2025    6. Near syncope  -     Ambulatory referral/consult to Cardiology; Future; Expected date: 01/16/2025         Follow up in about 1 year (around 1/9/2026) for Wellness with Labs.

## 2025-01-09 NOTE — ASSESSMENT & PLAN NOTE
Previously done labs reviewed with patient at time of appt today   Pap 1/2024 with gyn  Declines immunizations today

## 2025-01-09 NOTE — ASSESSMENT & PLAN NOTE
Taking flexeril and toradol as needed. Likely cervicogenic.  Doing well. Refills sent in as requested

## 2025-01-10 ENCOUNTER — TELEPHONE (OUTPATIENT)
Dept: FAMILY MEDICINE | Facility: CLINIC | Age: 40
End: 2025-01-10
Payer: COMMERCIAL

## 2025-01-10 NOTE — TELEPHONE ENCOUNTER
----- Message from Keily Linn MD sent at 1/10/2025 10:21 AM CST -----  Please inform patient of results.    1. Iron is low normal. Can supplement otc with slow fe and incorporate iron sources in diet  2. Folate and b12 are wnl. Ok to continue to supplement    Other labwork within acceptable ranges.

## 2025-01-10 NOTE — PROGRESS NOTES
Please inform patient of results.    1. Iron is low normal. Can supplement otc with slow fe and incorporate iron sources in diet  2. Folate and b12 are wnl. Ok to continue to supplement    Other labwork within acceptable ranges.